# Patient Record
Sex: FEMALE | Employment: UNEMPLOYED | URBAN - METROPOLITAN AREA
[De-identification: names, ages, dates, MRNs, and addresses within clinical notes are randomized per-mention and may not be internally consistent; named-entity substitution may affect disease eponyms.]

---

## 2017-02-13 ENCOUNTER — OFFICE VISIT (OUTPATIENT)
Dept: INTERNAL MEDICINE | Facility: CLINIC | Age: 37
End: 2017-02-13
Payer: MEDICAID

## 2017-02-13 VITALS
TEMPERATURE: 98.7 F | HEIGHT: 60 IN | SYSTOLIC BLOOD PRESSURE: 102 MMHG | HEART RATE: 78 BPM | BODY MASS INDEX: 27.66 KG/M2 | DIASTOLIC BLOOD PRESSURE: 62 MMHG | WEIGHT: 140.9 LBS

## 2017-02-13 DIAGNOSIS — L70.0 ACNE VULGARIS: Primary | ICD-10-CM

## 2017-02-13 DIAGNOSIS — Z13.6 CARDIOVASCULAR SCREENING; LDL GOAL LESS THAN 160: ICD-10-CM

## 2017-02-13 PROCEDURE — 99395 PREV VISIT EST AGE 18-39: CPT | Performed by: NURSE PRACTITIONER

## 2017-02-13 RX ORDER — BENZOYL PEROXIDE 10 G/100G
GEL TOPICAL DAILY
COMMUNITY
End: 2023-05-24

## 2017-02-13 NOTE — PROGRESS NOTES
SUBJECTIVE:     CC: Althea Garnt is an 36 year old woman who presents for preventive health visit.     Healthy Habits:    Do you get at least three servings of calcium containing foods daily (dairy, green leafy vegetables, etc.)? yes    Amount of exercise or daily activities, outside of work: Walk 30-45 minutes per day.    Problems taking medications regularly No    Medication side effects: No    Have you had an eye exam in the past two years? yes    Do you see a dentist twice per year? yes    Do you have sleep apnea, excessive snoring or daytime drowsiness?no            Today's PHQ-2 Score:   PHQ-2 ( 1999 [employer]) 2/13/2017 1/20/2016   Q1: Little interest or pleasure in doing things 0 -   Q2: Feeling down, depressed or hopeless 0 -   PHQ-2 Score 0 -   Little interest or pleasure in doing things - Not at all   Feeling down, depressed or hopeless - Not at all   PHQ-2 Score - 0       Abuse: Current or Past(Physical, Sexual or Emotional)- No  Do you feel safe in your environment - Yes    Social History   Substance Use Topics     Smoking status: Never Smoker     Smokeless tobacco: Not on file     Alcohol use No     The patient does not drink >3 drinks per day nor >7 drinks per week.    Recent Labs   Lab Test  01/20/16   1058   CHOL  151   HDL  55   LDL  89   TRIG  35   NHDL  96       Reviewed orders with patient.  Reviewed health maintenance and updated orders accordingly - Yes    Mammo Decision Support:  Mammogram not appropriate for this patient based on age.    Pertinent mammograms are reviewed under the imaging tab.  History of abnormal Pap smear: NO - age 30- 65 PAP every 3 years recommended  All Histories reviewed and updated in Epic.      ROS:  C: NEGATIVE for fever, chills, change in weight  I: NEGATIVE for worrisome rashes, moles or lesions  E: NEGATIVE for vision changes or irritation  ENT: NEGATIVE for ear, mouth and throat problems  R: NEGATIVE for significant cough or SOB  B: NEGATIVE for masses,  "tenderness or discharge  CV: NEGATIVE for chest pain, palpitations or peripheral edema  GI: NEGATIVE for nausea, abdominal pain, heartburn, or change in bowel habits  : NEGATIVE for unusual urinary or vaginal symptoms. Periods are regular.  M: NEGATIVE for significant arthralgias or myalgia  N: NEGATIVE for weakness, dizziness or paresthesias  P: NEGATIVE for changes in mood or affect    BP Readings from Last 3 Encounters:   02/13/17 102/62   01/20/16 110/72   12/07/15 118/68    Wt Readings from Last 3 Encounters:   02/13/17 140 lb 14.4 oz (63.9 kg)   01/20/16 134 lb 12.8 oz (61.1 kg)   12/07/15 134 lb 6.4 oz (61 kg)                  Patient Active Problem List   Diagnosis     CARDIOVASCULAR SCREENING; LDL GOAL LESS THAN 160     Acne vulgaris     History reviewed. No pertinent past surgical history.    Social History   Substance Use Topics     Smoking status: Never Smoker     Smokeless tobacco: Not on file     Alcohol use No     History reviewed. No pertinent family history.      Current Outpatient Prescriptions   Medication Sig Dispense Refill     benzoyl peroxide 10 % topical gel Apply topically daily       benzoyl peroxide 10 % CREA Externally apply topically 2 times daily 28 g 11     OBJECTIVE:     /62 (BP Location: Right arm, Patient Position: Chair, Cuff Size: Adult Regular)  Pulse 78  Temp 98.7  F (37.1  C) (Oral)  Ht 4' 11.75\" (1.518 m)  Wt 140 lb 14.4 oz (63.9 kg)  LMP 01/01/2017 (Exact Date)  Breastfeeding? No  BMI 27.75 kg/m2  EXAM:  GENERAL: healthy, alert and no distress  NECK: no adenopathy, no asymmetry, masses, or scars and thyroid normal to palpation  RESP: lungs clear to auscultation - no rales, rhonchi or wheezes  ABDOMEN: soft, nontender, no hepatosplenomegaly, no masses and bowel sounds normal  MS: no gross musculoskeletal defects noted, no edema    ASSESSMENT/PLAN:         ICD-10-CM    1. Acne vulgaris L70.0 benzoyl peroxide 10 % CREA   2. CARDIOVASCULAR SCREENING; LDL GOAL LESS THAN " "160 Z13.6        COUNSELING:   Reviewed preventive health counseling, as reflected in patient instructions         reports that she has never smoked. She does not have any smokeless tobacco history on file.    Estimated body mass index is 27.75 kg/(m^2) as calculated from the following:    Height as of this encounter: 4' 11.75\" (1.518 m).    Weight as of this encounter: 140 lb 14.4 oz (63.9 kg).   Weight management plan: Discussed healthy diet and exercise guidelines and patient will follow up in 12 months in clinic to re-evaluate.    Counseling Resources:  ATP IV Guidelines  Pooled Cohorts Equation Calculator  Breast Cancer Risk Calculator  FRAX Risk Assessment  ICSI Preventive Guidelines  Dietary Guidelines for Americans, 2010  USDA's MyPlate  ASA Prophylaxis  Lung CA Screening    Lili Valente NP  [facility]  "

## 2017-02-13 NOTE — MR AVS SNAPSHOT
After Visit Summary   2/13/2017    Althea Grant    MRN: [id number]           Patient Information     Date Of Birth          1980        Visit Information        Provider Department      2/13/2017 9:40 AM Lili Valente NP [facility]        Today's Diagnoses     Acne vulgaris    -  1    CARDIOVASCULAR SCREENING; LDL GOAL LESS THAN 160          Care Instructions      Preventive Health Recommendations  Female Ages 26 - 39  Yearly exam:   See your health care provider every year in order to    Review health changes.     Discuss preventive care.      Review your medicines if you your doctor has prescribed any.    Until age 30: Get a Pap test every three years (more often if you have had an abnormal result).    After age 30: Talk to your doctor about whether you should have a Pap test every 3 years or have a Pap test with HPV screening every 5 years.   You do not need a Pap test if your uterus was removed (hysterectomy) and you have not had cancer.  You should be tested each year for STDs (sexually transmitted diseases), if you're at risk.   Talk to your provider about how often to have your cholesterol checked.  If you are at risk for diabetes, you should have a diabetes test (fasting glucose).  Shots: Get a flu shot each year. Get a tetanus shot every 10 years.   Nutrition:     Eat at least 5 servings of fruits and vegetables each day.    Eat whole-grain bread, whole-wheat pasta and brown rice instead of white grains and rice.    Talk to your provider about Calcium and Vitamin D.     Lifestyle    Exercise at least 150 minutes a week (30 minutes a day, 5 days of the week). This will help you control your weight and prevent disease.    Limit alcohol to one drink per day.    No smoking.     Wear sunscreen to prevent skin cancer.    See your dentist every six months for an exam and cleaning.          Follow-ups after your visit        Who to contact     If you have questions  "or need follow up information about today's clinic visit or your schedule please contact [facility] directly at [phone].  Normal or non-critical lab and imaging results will be communicated to you by Spectrum[employer], letter or phone within 4 business days after the clinic has received the results. If you do not hear from us within 7 days, please contact the clinic through [employer] or phone. If you have a critical or abnormal lab result, we will notify you by phone as soon as possible.  Submit refill requests through [employer] or call your pharmacy and they will forward the refill request to us. Please allow 3 business days for your refill to be completed.          Additional Information About Your Visit        [employer] Information     [employer]re[employer] lets you send messages to your doctor, view your test results, renew your prescriptions, schedule appointments and more. To sign up, go to [email or web address] . Click on \"Log in\" on the left side of the screen, which will take you to the Welcome page. Then click on \"Sign up Now\" on the right side of the page.     You will be asked to enter the access code listed below, as well as some personal information. Please follow the directions to create your username and password.     Your access code is: [email or web address]  Expires: 2017 10:37 AM     Your access code will  in 90 days. If you need help or a new code, please call your [facility] clinic or [phone].        Care EveryWhere ID     This is your Care EveryWhere ID. This could be used by other organizations to access your [facility] medical records  [id number]        Your Vitals Were     Pulse Temperature Height Last Period Breastfeeding? BMI (Body Mass Index)    78 98.7  F (37.1  C) (Oral) 4' 11.75\" (1.518 m) 2017 (Exact Date) No 27.75 kg/m2       Blood Pressure from Last 3 Encounters:   17 102/62   16 110/72   12/07/15 118/68    Weight from Last 3 Encounters:   17 140 lb 14.4 oz " (63.9 kg)   01/20/16 134 lb 12.8 oz (61.1 kg)   12/07/15 134 lb 6.4 oz (61 kg)              Today, you had the following     No orders found for display         Today's Medication Changes          These changes are accurate as of: 2/13/17 10:37 AM.  If you have any questions, ask your nurse or doctor.               These medicines have changed or have updated prescriptions.        Dose/Directions    * benzoyl peroxide 10 % topical gel   This may have changed:  Another medication with the same name was added. Make sure you understand how and when to take each.   Changed by:  Lili Valente NP        Apply topically daily   Refills:  0       * benzoyl peroxide 10 % Crea   This may have changed:  You were already taking a medication with the same name, and this prescription was added. Make sure you understand how and when to take each.   Used for:  Acne vulgaris   Changed by:  Lili Valente NP        Externally apply topically 2 times daily   Quantity:  28 g   Refills:  11       * Notice:  This list has 2 medication(s) that are the same as other medications prescribed for you. Read the directions carefully, and ask your doctor or other care provider to review them with you.         Where to get your medicines      These medications were sent to [facility] [address] [place] [id number] [facility]  [address] [facility] [address]     Phone:  [phone]     benzoyl peroxide 10 % Crea                Primary Care Provider Office Phone # Fax #    Jaz ChristianISAEL Arroyo [facility] [phone]       [address] [address]        Thank you!     Thank you for choosing [facility]  for your care. Our goal is always to provide you with excellent care. Hearing back from our patients is one way we can continue to improve our services. Please take a few minutes to complete the written survey that you may receive in the mail after your  visit with us. Thank you!             Your Updated Medication List - Protect others around you: Learn how to safely use, store and throw away your medicines at www.disposemymeds.org.          This list is accurate as of: 2/13/17 10:37 AM.  Always use your most recent med list.                   Brand Name Dispense Instructions for use    * benzoyl peroxide 10 % topical gel      Apply topically daily       * benzoyl peroxide 10 % Crea     28 g    Externally apply topically 2 times daily       * Notice:  This list has 2 medication(s) that are the same as other medications prescribed for you. Read the directions carefully, and ask your doctor or other care provider to review them with you.

## 2019-05-02 ENCOUNTER — TELEPHONE (OUTPATIENT)
Dept: FAMILY MEDICINE | Facility: CLINIC | Age: 39
End: 2019-05-02

## 2019-05-02 NOTE — TELEPHONE ENCOUNTER
5/2/2019    Call Regarding ReattributionPhysical    Attempt 1    Message with male    Comments: SPOKE TO MOTHER KAUSHIK, WILL HAVE PATIENT CALL AND SCHEDULE ON OWN TIME.       Outreach   JOSEPH

## 2019-12-04 NOTE — TELEPHONE ENCOUNTER
12/4/2019    Call Regarding ReattributionPhysical    Attempt 2    Comments: left bryce Nicholson

## 2019-12-12 ENCOUNTER — TELEPHONE (OUTPATIENT)
Dept: SCHEDULING | Facility: CLINIC | Age: 39
End: 2019-12-12

## 2023-05-24 ENCOUNTER — OFFICE VISIT (OUTPATIENT)
Dept: INTERNAL MEDICINE | Facility: CLINIC | Age: 43
End: 2023-05-24
Payer: MEDICAID

## 2023-05-24 VITALS
HEART RATE: 78 BPM | DIASTOLIC BLOOD PRESSURE: 80 MMHG | SYSTOLIC BLOOD PRESSURE: 144 MMHG | RESPIRATION RATE: 20 BRPM | BODY MASS INDEX: 31.53 KG/M2 | WEIGHT: 160.6 LBS | TEMPERATURE: 98.7 F | OXYGEN SATURATION: 99 % | HEIGHT: 60 IN

## 2023-05-24 DIAGNOSIS — Z13.220 SCREENING FOR HYPERLIPIDEMIA: ICD-10-CM

## 2023-05-24 DIAGNOSIS — Z12.31 ENCOUNTER FOR SCREENING MAMMOGRAM FOR BREAST CANCER: ICD-10-CM

## 2023-05-24 DIAGNOSIS — Z13.0 SCREENING FOR IRON DEFICIENCY ANEMIA: ICD-10-CM

## 2023-05-24 DIAGNOSIS — Z13.1 SCREENING FOR DIABETES MELLITUS: ICD-10-CM

## 2023-05-24 DIAGNOSIS — Z00.00 ENCOUNTER FOR PREVENTIVE HEALTH EXAMINATION: Primary | ICD-10-CM

## 2023-05-24 DIAGNOSIS — Z59.9 NEED FOR FINANCIAL SUPPORT: ICD-10-CM

## 2023-05-24 LAB
ANION GAP SERPL CALCULATED.3IONS-SCNC: 12 MMOL/L (ref 7–15)
BUN SERPL-MCNC: 9.6 MG/DL (ref 6–20)
CALCIUM SERPL-MCNC: 9.8 MG/DL (ref 8.6–10)
CHLORIDE SERPL-SCNC: 102 MMOL/L (ref 98–107)
CHOLEST SERPL-MCNC: 162 MG/DL
CREAT SERPL-MCNC: 0.65 MG/DL (ref 0.51–0.95)
DEPRECATED HCO3 PLAS-SCNC: 23 MMOL/L (ref 22–29)
ERYTHROCYTE [DISTWIDTH] IN BLOOD BY AUTOMATED COUNT: 12.7 % (ref 10–15)
GFR SERPL CREATININE-BSD FRML MDRD: >90 ML/MIN/1.73M2
GLUCOSE SERPL-MCNC: 92 MG/DL (ref 70–99)
HCT VFR BLD AUTO: 38.7 % (ref 35–47)
HDLC SERPL-MCNC: 53 MG/DL
HGB BLD-MCNC: 13.1 G/DL (ref 11.7–15.7)
LDLC SERPL CALC-MCNC: 86 MG/DL
MCH RBC QN AUTO: 30.7 PG (ref 26.5–33)
MCHC RBC AUTO-ENTMCNC: 33.9 G/DL (ref 31.5–36.5)
MCV RBC AUTO: 91 FL (ref 78–100)
NONHDLC SERPL-MCNC: 109 MG/DL
PLATELET # BLD AUTO: 329 10E3/UL (ref 150–450)
POTASSIUM SERPL-SCNC: 4 MMOL/L (ref 3.4–5.3)
RBC # BLD AUTO: 4.27 10E6/UL (ref 3.8–5.2)
SODIUM SERPL-SCNC: 137 MMOL/L (ref 136–145)
TRIGL SERPL-MCNC: 117 MG/DL
WBC # BLD AUTO: 10.4 10E3/UL (ref 4–11)

## 2023-05-24 PROCEDURE — 80048 BASIC METABOLIC PNL TOTAL CA: CPT

## 2023-05-24 PROCEDURE — 36415 COLL VENOUS BLD VENIPUNCTURE: CPT

## 2023-05-24 PROCEDURE — 99386 PREV VISIT NEW AGE 40-64: CPT

## 2023-05-24 PROCEDURE — 80061 LIPID PANEL: CPT

## 2023-05-24 PROCEDURE — 85027 COMPLETE CBC AUTOMATED: CPT

## 2023-05-24 SDOH — ECONOMIC STABILITY - INCOME SECURITY: PROBLEM RELATED TO HOUSING AND ECONOMIC CIRCUMSTANCES, UNSPECIFIED: Z59.9

## 2023-05-24 ASSESSMENT — ENCOUNTER SYMPTOMS
DYSURIA: 0
ARTHRALGIAS: 0
COUGH: 0
ABDOMINAL PAIN: 0
PALPITATIONS: 0
CHILLS: 0
FREQUENCY: 0
SORE THROAT: 0
EYE PAIN: 0
CONSTIPATION: 0
MYALGIAS: 0
HEMATOCHEZIA: 0
JOINT SWELLING: 0
WEAKNESS: 0
DIZZINESS: 0
PARESTHESIAS: 0
NERVOUS/ANXIOUS: 0
HEMATURIA: 0
DIARRHEA: 0
HEADACHES: 0
BREAST MASS: 0
NAUSEA: 0
SHORTNESS OF BREATH: 0
HEARTBURN: 0
FEVER: 0

## 2023-05-24 ASSESSMENT — PAIN SCALES - GENERAL: PAINLEVEL: NO PAIN (0)

## 2023-05-24 NOTE — PROGRESS NOTES
SUBJECTIVE:   CC: Althea is an 42 year old who presents for preventive health visit.       2023     1:09 PM   Additional Questions   Roomed by Edelmira   Accompanied by mother in-law Jody         2023     1:09 PM   Patient Reported Additional Medications   Patient reports taking the following new medications no     Patient has been advised of split billing requirements and indicates understanding: Yes  Healthy Habits:     Getting at least 3 servings of Calcium per day:  Yes    Bi-annual eye exam:  NO    Dental care twice a year:  Yes    Sleep apnea or symptoms of sleep apnea:  None    Diet:  Other    Frequency of exercise:  6-7 days/week    Duration of exercise:  15-30 minutes    Taking medications regularly:  Yes    Medication side effects:  None and Other    PHQ-2 Total Score: 0    Additional concerns today:  No        Today's PHQ-2 Score:       2023     1:01 PM   PHQ-2 (  [employer])   Q1: Little interest or pleasure in doing things 0   Q2: Feeling down, depressed or hopeless 0   PHQ-2 Score 0   Q1: Little interest or pleasure in doing things Not at all   Q2: Feeling down, depressed or hopeless Not at all   PHQ-2 Score 0       Have you ever done Advance Care Planning? (For example, a Health Directive, POLST, or a discussion with a medical provider or your loved ones about your wishes): No, advance care planning information given to patient to review.  Patient plans to discuss their wishes with loved ones or provider.      Social History     Tobacco Use     Smoking status: Never     Smokeless tobacco: Not on file   Vaping Use     Vaping status: Never Used   Substance Use Topics     Alcohol use: No             2023     1:00 PM   Alcohol Use   Prescreen: >3 drinks/day or >7 drinks/week? Not Applicable     Reviewed orders with patient.  Reviewed health maintenance and updated orders accordingly - Yes  Lab work is in process    Breast Cancer Screenin/24/2023     1:01 PM   Breast  "CA Risk Assessment (FHS-7)   Do you have a family history of breast, colon, or ovarian cancer? No / Unknown       Pertinent mammograms are reviewed under the imaging tab.    History of abnormal Pap smear: results below       1/20/2016    12:00 AM   PAP / HPV   PAP (Historical) NIL       Reviewed and updated as needed this visit by clinical staff   Tobacco  Allergies  Meds              Reviewed and updated as needed this visit by Provider                   Review of Systems   Constitutional: Negative for chills and fever.   HENT: Negative for congestion, ear pain, hearing loss and sore throat.    Eyes: Negative for pain and visual disturbance.   Respiratory: Negative for cough and shortness of breath.    Cardiovascular: Negative for chest pain, palpitations and peripheral edema.   Gastrointestinal: Negative for abdominal pain, constipation, diarrhea, heartburn, hematochezia and nausea.   Breasts:  Negative for tenderness, breast mass and discharge.   Genitourinary: Negative for dysuria, frequency, genital sores, hematuria, pelvic pain, urgency, vaginal bleeding and vaginal discharge.   Musculoskeletal: Negative for arthralgias, joint swelling and myalgias.   Skin: Negative for rash.   Neurological: Negative for dizziness, weakness, headaches and paresthesias.   Psychiatric/Behavioral: Negative for mood changes. The patient is not nervous/anxious.         OBJECTIVE:   BP (!) 144/80 (BP Location: Right arm, Patient Position: Sitting, Cuff Size: Adult Regular)   Pulse 78   Temp 98.7  F (37.1  C) (Oral)   Resp 20   Ht 1.511 m (4' 11.5\")   Wt 72.8 kg (160 lb 9.6 oz)   LMP 05/10/2023 (Approximate)   SpO2 99%   Breastfeeding No   BMI 31.89 kg/m    Physical Exam  Constitutional:       Appearance: Normal appearance.   Cardiovascular:      Rate and Rhythm: Normal rate and regular rhythm.      Heart sounds: Normal heart sounds.   Pulmonary:      Effort: Pulmonary effort is normal.      Breath sounds: Normal breath " "sounds.   Skin:     General: Skin is warm and dry.   Neurological:      Mental Status: She is alert and oriented to person, place, and time.   Psychiatric:         Attention and Perception: She is inattentive.         Mood and Affect: Mood and affect normal.         Speech: Speech normal.         Behavior: Behavior is hyperactive.         Thought Content: Thought content normal.         Diagnostic Test Results:  Labs reviewed in Epic    ASSESSMENT/PLAN:   (Z00.00) Encounter for preventive health examination  (primary encounter diagnosis)  Comment: Pt presents for physical. She is due for pap smear- will schedule this. Pt states her  made her come in for physical but she is healthy as can be and doesn't require any monitoring. After further discussion, she is willing to have labs done.    (Z59.9) Need for financial support  Plan: Primary Care - Care Coordination Referral            (Z13.1) Screening for diabetes mellitus  Plan: Basic metabolic panel  (Ca, Cl, CO2, Creat,         Gluc, K, Na, BUN)          (Z13.0) Screening for iron deficiency anemia  Plan: CBC with platelets            (Z13.220) Screening for hyperlipidemia  Plan: Lipid panel reflex to direct LDL Non-fasting            (Z12.31) Encounter for screening mammogram for breast cancer  Plan: *MA Screening Digital Bilateral             Patient has been advised of split billing requirements and indicates understanding: Yes      COUNSELING:  Reviewed preventive health counseling, as reflected in patient instructions       Regular exercise       Healthy diet/nutrition      BMI:   Estimated body mass index is 31.89 kg/m  as calculated from the following:    Height as of this encounter: 1.511 m (4' 11.5\").    Weight as of this encounter: 72.8 kg (160 lb 9.6 oz).   Weight management plan: Discussed healthy diet and exercise guidelines      She reports that she has never smoked. She does not have any smokeless tobacco history on file.          Mar Coles, " APRN CNP  [facility]

## 2023-05-24 NOTE — LETTER
May 30, 2023      Althea DAMON Juanita  [address] [place] [address]        Dear ,    We are writing to inform you of your test results.    The blood counts are normal. The LDL, bad cholesterol, is at your goal of < 130.  Your blood sugar and kidney tests, creatinine and GFR, are normal.     Resulted Orders   Basic metabolic panel  (Ca, Cl, CO2, Creat, Gluc, K, Na, BUN)   Result Value Ref Range    Sodium 137 136 - 145 mmol/L    Potassium 4.0 3.4 - 5.3 mmol/L    Chloride 102 98 - 107 mmol/L    Carbon Dioxide (CO2) 23 22 - 29 mmol/L    Anion Gap 12 7 - 15 mmol/L    Urea Nitrogen 9.6 6.0 - 20.0 mg/dL    Creatinine 0.65 0.51 - 0.95 mg/dL    Calcium 9.8 8.6 - 10.0 mg/dL    Glucose 92 70 - 99 mg/dL    GFR Estimate >90 >60 mL/min/1.73m2      Comment:      eGFR calculated using 2021 CKD-EPI equation.   Lipid panel reflex to direct LDL Non-fasting   Result Value Ref Range    Cholesterol 162 <200 mg/dL    Triglycerides 117 <150 mg/dL    Direct Measure HDL 53 >=50 mg/dL    LDL Cholesterol Calculated 86 <=100 mg/dL    Non HDL Cholesterol 109 <130 mg/dL    Narrative    Cholesterol  Desirable:  <200 mg/dL    Triglycerides  Normal:  Less than 150 mg/dL  Borderline High:  150-199 mg/dL  High:  200-499 mg/dL  Very High:  Greater than or equal to 500 mg/dL    Direct Measure HDL  Female:  Greater than or equal to 50 mg/dL   Male:  Greater than or equal to 40 mg/dL    LDL Cholesterol  Desirable:  <100mg/dL  Above Desirable:  100-129 mg/dL   Borderline High:  130-159 mg/dL   High:  160-189 mg/dL   Very High:  >= 190 mg/dL    Non HDL Cholesterol  Desirable:  130 mg/dL  Above Desirable:  130-159 mg/dL  Borderline High:  160-189 mg/dL  High:  190-219 mg/dL  Very High:  Greater than or equal to 220 mg/dL   CBC with platelets   Result Value Ref Range    WBC Count 10.4 4.0 - 11.0 10e3/uL    RBC Count 4.27 3.80 - 5.20 10e6/uL    Hemoglobin 13.1 11.7 - 15.7 g/dL    Hematocrit 38.7 35.0 - 47.0 %    MCV 91 78 - 100 fL    MCH  30.7 26.5 - 33.0 pg    MCHC 33.9 31.5 - 36.5 g/dL    RDW 12.7 10.0 - 15.0 %    Platelet Count 329 150 - 450 10e3/uL       If you have any questions or concerns, please call the clinic at the number listed above.       Sincerely,      ISAEL Mills CNP

## 2023-05-25 ENCOUNTER — PATIENT OUTREACH (OUTPATIENT)
Dept: CARE COORDINATION | Facility: CLINIC | Age: 43
End: 2023-05-25
Payer: MEDICAID

## 2023-05-25 NOTE — PROGRESS NOTES
Clinic Care Coordination Contact  Community Health Worker Initial Outreach    CHW Initial Information Gathering:  Referral Source: PCP  Current living arrangement:: I live in a private home  Community Resources: [facility]  Informal Support system:: Children, Family (Mother in-law Jody)  No PCP office visit in Past Year: No  Transportation means:: Family (Mother in-lawJody)       Patient accepts CC: Yes. Patient scheduled for assessment with CC SW on 5/31/2023 at 10:00 AM. Patient noted desire to discuss Housing.     5-25, CHW:    CHW was able to connect with the Patient's mother in-lawJody (CTC), and introduce self/care coordination and intent of call.     Jody shared that both the Patient and her son are obtaining Social Security Disability. Currently, they are living with their daughter. Jody is hoping the Patient and her son can transition into their own housing. Jody believes they were working with [facility] on obtaining a housing voucher, however she is unsure the status of this.     Both the Patient and her  do not have a drivers license. So, they rely on Jody to bring them to and from medical appointments etc. However, Jody states she is increasing in age and will not be able to assist long-term.    Jody had no other questions or concerns at this time. Her main concern is housing and transportation for the Patient and her son.       Claire TRONCOSO. Public Health  Community Health Worker  [facility]  Clinic Care Coordination  [phone]

## 2023-05-31 ENCOUNTER — PATIENT OUTREACH (OUTPATIENT)
Dept: NURSING | Facility: CLINIC | Age: 43
End: 2023-05-31
Payer: MEDICAID

## 2023-05-31 ASSESSMENT — ACTIVITIES OF DAILY LIVING (ADL): DEPENDENT_IADLS:: TRANSPORTATION

## 2023-05-31 NOTE — LETTER
[facility] CARE COORDINATION  [address] [place] [address]    May 31, 2023      Jody Grant  [address] [place] [address]          Dear Jody/Althea,    I am a clinic care coordinator who works with the [facility] [place] [facility]. I wanted to thank you for spending the time to talk with me. Below is a description of clinic care coordination and how I can further assist you shall any further needs arise.       The clinic care coordination team is made up of a registered nurse, , financial resource worker and community health worker who understand the health care system. The goal of clinic care coordination is to help you manage your health and improve access to the health care system. Our team works alongside your provider to assist you in determining your health and social needs. We can help you obtain health care and community resources, providing you with necessary information and education. We can work with you through any barriers and develop a care plan that helps coordinate and strengthen the communication between you and your care team.  Our services are voluntary and are offered without charge to you personally.    Please feel free to contact me with any questions or concerns regarding care coordination and what we can offer.      We are focused on providing you with the highest-quality healthcare experience possible.    Sincerely,     JOAO Martinez/[facility]  Social Work Care Coordinator  [facility] - [place], [place], [facility], and [place]  Phone: [phone]    Enclosed: I have enclosed the resources we discussed over the phone. Please review and call me with any questions.       Resources    [place] Transportation Programs:   https://www.mtInnobits-inc.net/[place]/  Who can get rides?   In [place], [facility] provides NEMT for recipients who are one of the following:  Enrolled in Medical Assistance  Enrolled in  state-only funded Medical Assistance benefits due to residing in an Institution of Mental Diseases (IMD)  A [facility] enrollee under the age of 21  A pregnant woman enrolled in [facility]  Enrolled in Refugee Medical Assistance (RMA)  Recipients must be attending a covered medical service and have no other way to get there. If you can drive yourself to your covered appointment, or if a neighbor, friend, relative, or voluntary organization is able to give you a ride for free, you cannot get a ride through [facility]. However, you may be eligible for mileage reimbursement. [email or web address]    How do I schedule a ride?   Call us at [phone] at least three business days (Monday through Friday) before your healthcare appointment. If you call with less notice and the trip is not urgent, you may need to set up your visit for a different date. We schedule routine trips Monday through Friday from 7 a.m. to 6 p.m. Urgent rides can be scheduled after these hours, 24 hours a day, seven days a week.  When you call, please be ready to provide:  Your name, home address, and phone number  Your Medical Assistance ID Number (PMI#), an eight-digit number that begins with 0  The name, phone number, address, and ZIP code of the healthcare provider you are seeing  The date and start time of your appointment  The end time of your appointment, if you know it  Any special ride needs, including if you need someone to ride with you  General reason for the appointment (check-up, eye doctor's appointment, etc.)  If you are hearing or speech impaired, please call [phone] for TTY services. If you speak another language, [facility] will connect you with a  when you call [phone].    Remember:  All rides must be for an eligible, covered medical service  You can schedule a ride Monday through Friday from 7 a.m. to 6 p.m.  You must call at least three business days before your appointment  Have your trip  information ready when you call  Be ready at least 15 minutes before your ride is scheduled to arrive    Important Toll-Free Phone Numbers:  To schedule a ride call [phone]  To file a complaint call [phone]  If your ride is late call [phone]

## 2023-05-31 NOTE — PROGRESS NOTES
Clinic Care Coordination Contact    Clinic Care Coordination Contact  OUTREACH    Referral Information:  Referral Source: PCP    Chief Complaint   Patient presents with     Clinic Care Coordination - Initial      Universal Utilization: Reviewed 5/31/2023  Clinic Utilization  Difficulty keeping appointments:: No  Compliance Concerns: No  No-Show Concerns: No  No PCP office visit in Past Year: No  Utilization    Hospital Admissions  0             ED Visits  0             No Show Count (past year)  0                Current as of: 5/30/2023  9:15 AM            Clinical Concerns:  Current Medical Concerns:    Patient Active Problem List   Diagnosis     CARDIOVASCULAR SCREENING; LDL GOAL LESS THAN 160     Acne vulgaris   Current Behavioral Concerns: none noted     Education Provided to patient/family: [facility] role and reason for call.    Pain  Pain (GOAL):: No  Health Maintenance Reviewed: Due/Overdue   Health Maintenance Due   Topic Date Due     HEPATITIS B IMMUNIZATION (1 of 3 - 3-dose series) Never done     HIV SCREENING  Never done     HEPATITIS C SCREENING  Never done     PAP  01/20/2019     COVID-19 Vaccine (3 - Pfizer series) 11/10/2021     INFLUENZA VACCINE (1) 09/01/2022   Clinical Pathway: None    Medication Management:  Medication review status: Medications reviewed and no changes reported per patient.        No current outpatient medications on file.     No current facility-administered medications for this visit.      Functional Status:  Dependent IADLs:: Transportation    Living Situation:  Current living arrangement:: I live in a private home with family    Lifestyle & Psychosocial Needs:  Social Determinants of Health     Tobacco Use: Unknown (5/24/2023)    Patient History      Smoking Tobacco Use: Never      Smokeless Tobacco Use: Unknown      Passive Exposure: Not on file   Alcohol Use: Not on file   Financial Resource Strain: Not on file   Food Insecurity: Not on file   Transportation Needs: Not on file    Physical Activity: Not on file   Stress: Not on file   Social Connections: Not on file   Intimate Partner Violence: Not on file   Depression: Not at risk (5/24/2023)    PHQ-2      PHQ-2 Score: 0   Housing Stability: Not on file     Transportation means:: Family (Mother in-law, Jody)  Orthodox or spiritual beliefs that impact treatment:: No  Informal Support system:: Children, Family (Mother in-law Jody)     [facility] spoke with patients Jody SERNA, (consent to communicate on file) to introduce self and offer Care Coordination services. Jody shared patient and her spouse, Reggie, are in need of housing. Jody shared they use to live with her, however, recently moved out due to limited space. Patient/spouse currently living with their 22 year old daughter while awaiting housing for self. Jody requesting assistance on patients behalf for assistance with obtaining housing support/resources. Per report patient/spouse already established with housing supports/resources. Reviewed with Jody whom was unable to confirm. Jody requesting CC contact patient/spouse to explore further. [facility] spoke with patient/spouse whom shared they have received resources for housing and plan to establish contact this week. Patient reports she was provided with contact information for [facility], [employer], [employer], [employer], and [employer]. [facility] reviewed above agencies and programs they offer. Patient expressed understanding and declined any further CC needs at this time. [facility] updated patients Jody SERNA, on above. Jody thankful patient/spouse are connected with support and services regarding housing. Allowed time and space for Jody to voice any additional CC needs. Jody shared transportation is also a barrier for patient/spouse and requested resources. Reviewed patient has transportation benefits to medical appointments via [place]. Jody expressed understanding. Offered to send additional information on  above benefit, as well as, contact information to schedule transportation. Jody in agreement with plan and requested [place] [address]. Please refer to letter sent 6/1/2023 for further information regarding resources provided. No further CC needs identified at this time. Patient/family declined CC enrollment and shared preference is to contact [facility] if/when further needs arise.      Resources and Interventions:  Current Resources:   Community Resources: [facility]  Supplies Currently Used at Home: None  Equipment Currently Used at Home: none  Employment Status: disabled  Advance Care Plan/Directive  Advanced Care Plans/Directives on file:: No  Referrals Placed: Community Resources    Care Plan: None    Plan: Patient/family were provided with SW CC's contact information and encouraged to call with questions, concerns, and/or support needs. SW CC will remain available as needed. No further care coordination outreaches will be made at this time.     JOAO Martinez/LICSW  Social Work Care Coordinator  [facility] - [place], [place], [facility], and [place]  Phone: [phone]

## 2023-06-12 ENCOUNTER — DOCUMENTATION ONLY (OUTPATIENT)
Dept: OTHER | Facility: CLINIC | Age: 43
End: 2023-06-12
Payer: MEDICAID

## 2023-06-14 ENCOUNTER — ALLIED HEALTH/NURSE VISIT (OUTPATIENT)
Dept: INTERNAL MEDICINE | Facility: CLINIC | Age: 43
End: 2023-06-14
Payer: MEDICAID

## 2023-06-14 DIAGNOSIS — Z23 HIGH PRIORITY FOR 2019-NCOV VACCINE: Primary | ICD-10-CM

## 2023-06-14 PROCEDURE — 99207 PR NO CHARGE NURSE ONLY: CPT

## 2023-06-14 PROCEDURE — 0121A COVID-19 BIVALENT 12+ (PFIZER): CPT

## 2023-06-14 PROCEDURE — 91312 COVID-19 BIVALENT 12+ (PFIZER): CPT

## 2023-06-14 NOTE — NURSING NOTE
Prior to immunization administration, verified patients identity using patient s name and date of birth. Please see Immunization Activity for additional information.     Screening Questionnaire for Adult Immunization    Are you sick today?   No   Do you have allergies to medications, food, a vaccine component or latex?   No   Have you ever had a serious reaction after receiving a vaccination?   No   Do you have a long-term health problem with heart, lung, kidney, or metabolic disease (e.g., diabetes), asthma, a blood disorder, no spleen, complement component deficiency, a cochlear implant, or a spinal fluid leak?  Are you on long-term aspirin therapy?   No   Do you have cancer, leukemia, HIV/AIDS, or any other immune system problem?   No   Do you have a parent, brother, or sister with an immune system problem?   No   In the past 3 months, have you taken medications that affect  your immune system, such as prednisone, other steroids, or anticancer drugs; drugs for the treatment of rheumatoid arthritis, Crohn s disease, or psoriasis; or have you had radiation treatments?   No   Have you had a seizure, or a brain or other nervous system problem?   No   During the past year, have you received a transfusion of blood or blood    products, or been given immune (gamma) globulin or antiviral drug?   No   For women: Are you pregnant or is there a chance you could become       pregnant during the next month?   No   Have you received any vaccinations in the past 4 weeks?   No     Immunization questionnaire answers were all negative.    I have reviewed the following standing orders:   This patient is due and qualifies for the Covid-19 vaccine.     Click here for COVID-19 Standing Order    I have reviewed the vaccines inclusion and exclusion criteria; No concerns regarding eligibility.     Patient instructed to remain in clinic for 15 minutes afterwards, and to report any adverse reactions.     Screening performed by Maya VINSON  MITESH Pritchard on 6/14/2023 at 3:32 PM.

## 2023-06-28 ENCOUNTER — ANCILLARY PROCEDURE (OUTPATIENT)
Dept: MAMMOGRAPHY | Facility: CLINIC | Age: 43
End: 2023-06-28
Payer: MEDICAID

## 2023-06-28 DIAGNOSIS — Z12.31 ENCOUNTER FOR SCREENING MAMMOGRAM FOR BREAST CANCER: ICD-10-CM

## 2023-06-28 PROCEDURE — 77067 SCR MAMMO BI INCL CAD: CPT | Mod: TC | Performed by: RADIOLOGY

## 2023-10-06 ENCOUNTER — TELEPHONE (OUTPATIENT)
Dept: INTERNAL MEDICINE | Facility: CLINIC | Age: 43
End: 2023-10-06
Payer: MEDICAID

## 2023-10-06 DIAGNOSIS — Z59.9 NEED FOR FINANCIAL SUPPORT: Primary | ICD-10-CM

## 2023-10-06 SDOH — ECONOMIC STABILITY - INCOME SECURITY: PROBLEM RELATED TO HOUSING AND ECONOMIC CIRCUMSTANCES, UNSPECIFIED: Z59.9

## 2023-10-06 NOTE — TELEPHONE ENCOUNTER
Attempted to contact patient twice, but no voicemail set up, no alternative phone number and patient not MyChart active. If patient is okay with care coordination referral for financial help regarding housing, RN can place/sign referral.    Thank you,  Heber Romero, Triage RN Liu Pete  12:53 PM 10/6/2023     
Care coordinator referral signed.   
Patient calls today. She said when she was seen in May 2023, Mar said if you need anything else to pls call.  Patient says she needs help finding a  to help her find housing. She and her  currently live with her daughter. She says its fine but would like to move out to just be with her . She is on ssi so needs help finding housing w/in her budget.     Will this be a care coordinator referral to ?     Patient number [phone]   
How Severe Is This Condition?: moderate

## 2023-10-09 ENCOUNTER — PATIENT OUTREACH (OUTPATIENT)
Dept: CARE COORDINATION | Facility: CLINIC | Age: 43
End: 2023-10-09
Payer: MEDICAID

## 2023-10-09 NOTE — PROGRESS NOTES
Clinic Care Coordination Contact  [facility]/Voicemail       Clinical Data: Care Coordinator Outreach  Outreach attempted x 1.  Left message on patient's voicemail with call back information and requested return call.    Plan: Care Coordinator will try to reach patient again in 1-2 business days.      Claire ALBERTO [facility]  Community Health Worker  [facility]:  [facility] Care Coordination  [phone]

## 2023-10-10 NOTE — PROGRESS NOTES
"Clinic Care Coordination Contact  Community Health Worker Initial Outreach    CHW Initial Information Gathering:  Current living arrangement:: Not Assessed  Informal Support system:: Children, Family  No PCP office visit in Past Year: No       Patient accepts CC: Yes. Patient scheduled for assessment with CC SW on 10/16/2023 at 10:00 AM. Patient noted desire to discuss Section 8 housing choice voucher/public housing through the CDA.     10-10, CHW:    CHW was able to connect with the Patient and introduce self/care coordination and intent of call. Writer unable to complete CHW initial assessment due to the nature of call and subjects frequently being changed.   Patient wondering \"what do you have in mind for me [in terms of housing].\" Writer explained Primary Care Care Coordination role. Per Patient she \"needs a  to get into one of those places.\"  Per discussion, patient has been going to different rental properties and they keep telling her she needs a \"piece of paper\" to qualify. Patient states paperwork from [employer]. Writer informed this is probably a section 8 housing choice voucher through the CDA. Patient does not know where to go to for help states she is feeling lost.  Patient shares she is interested in a townhome or duplex set up rather than apartment style housing for personal reasons.  Patient wondering about credit score and how it relates to renting.   Patient now lives with daughter who helps with computer tasks.         Claire ALBERTO [facility]  Community Health Worker  [facility] Clinics:  [place], [place] & [place]   Clinic Care Coordination  [phone]       "

## 2023-10-16 ENCOUNTER — PATIENT OUTREACH (OUTPATIENT)
Dept: NURSING | Facility: CLINIC | Age: 43
End: 2023-10-16
Payer: MEDICAID

## 2023-10-16 ASSESSMENT — ACTIVITIES OF DAILY LIVING (ADL): DEPENDENT_IADLS:: TRANSPORTATION

## 2023-10-16 NOTE — LETTER
[facility]  Patient Centered Plan of Care  About Me:        Patient Name:  Althea Maldonado    YOB: 1980  Age:         42 year old   Liu MRN:    [id number] Telephone Information:  Home Phone [phone]   Mobile [phone]   Mobile Not on file.       Address:  [address]  [place] [address] Email address:  No e-mail address on record      Emergency Contact(s)    Name Relationship Lgl Grd Work Phone Home Phone Mobile Phone   1. YRN MALDONADO* Daughter No   [phone]   2. JEFF PEREIRA Son-in-Law No   [phone]           Primary language:  English     needed? No   [place] [employer]:  [phone] op. 1  Other communication barriers:None  Preferred Method of Communication:  Mail  Current living arrangement: I live in a private home with family  Mobility Status/ Medical Equipment: Independent    Health Maintenance  Health Maintenance Reviewed: Due/Overdue   Health Maintenance Due   Topic Date Due    HEPATITIS B IMMUNIZATION (1 of 3 - 3-dose series) Never done    HIV SCREENING  Never done    HEPATITIS C SCREENING  Never done    PAP  01/20/2019    INFLUENZA VACCINE (1) 09/01/2023    COVID-19 Vaccine (4 - 2023-24 season) 09/01/2023     My Access Plan  Medical Emergency 911   Primary Clinic Line    24 Hour Appointment Line [phone] or  [phone]) (toll-free)   24 Hour Nurse Line [phone] (toll-free)   Preferred Urgent Care No data recorded   Preferred Hospital [facility]  [phone]     Preferred Pharmacy [facility] [address] [place] [address]     Behavioral Health Crisis Line The National Suicide Prevention Lifeline at 1-485.767.1544 or Text/Call 548     My Care Team Members  Patient Care Team         Relationship Specialty Notifications Start End    No Ref-Primary, Physician PCP - General   5/24/23     Fax: [phone]         Mar Coles, APRN CNP Assigned PCP   5/27/23      Phone: [phone] Fax: [phone]         [address] [facility] [address]    Selina Templeton, [facility] Lead Care Coordinator  - Clinical Admissions 10/10/23     Phone: [phone] Fax: [phone]                  My Care Plans  Self Management and Treatment Plan  Care Plan  Care Plan: Housing Instability       Problem: SDOH LACK OF STABLE HOUSING       Goal: Establish Stable Housing       Start Date: 10/16/2023 Expected End Date: 2/12/2024    This Visit's Progress: 10%    Priority: High    Note:     Barriers: difficulty navigating services, limited support.   Strengths: goal oriented, motivated  Patient expressed understanding of goal: Yes  Action steps to achieve this goal:  I will continue to lean on informal supports of my: family  I will review resources and supports sent to me via [employer]  I will outreach to supports and consider establishing with ones I might find beneficial.   I will contact my care team with questions, concerns, support needs.   I will use the clinic as a resource and I understand I can contact my clinic with 24/7 after hours services available.   I will continue to outreach to care coordination as needed for additional resources or supports.                              Care Plan: Community Resources       Problem: Lack of supportive services       Goal: Obtain a County        Start Date: 10/16/2023 Expected End Date: 2/12/2024    This Visit's Progress: 10%    Priority: High    Note:     Barriers: difficulty navigating services, limited support.   Strengths: goal oriented, motivated  Patient expressed understanding of goal: Yes  Action steps to achieve this goal:  I will continue to lean on informal supports of my: family  I will review resources and supports sent to me via [employer]  I will outreach to supports and consider establishing with ones I might find beneficial.  I will continue to outreach to care coordination as needed for additional  resources or supports.                               Advance Care Plans/Directives Type:   No data recorded      My Medical and Care Information  Problem List   Patient Active Problem List   Diagnosis    CARDIOVASCULAR SCREENING; LDL GOAL LESS THAN 160    Acne vulgaris        Current Medications and Allergies:   No current outpatient medications on file.     No current facility-administered medications for this visit.     No Known Allergies    Care Coordination Start Date: 10/6/2023   Frequency of Care Coordination: monthly     Form Last Updated: 10/17/2023

## 2023-10-16 NOTE — PROGRESS NOTES
Clinic Care Coordination Contact  Clinic Care Coordination Contact  OUTREACH    Referral Information:  Referral Source: PCP  Primary Diagnosis: Psychosocial    Chief Complaint   Patient presents with    Clinic Care Coordination - Initial     Universal Utilization: Reviewed 10/16/2023  Clinic Utilization  Difficulty keeping appointments:: No  Compliance Concerns: No  No-Show Concerns: No  No PCP office visit in Past Year: No  Utilization      No Show Count (past year)  1             ED Visits  0             Hospital Admissions  0                    Current as of: 10/16/2023  3:32 PM              Clinical Concerns:  Current Medical Concerns:    Patient Active Problem List   Diagnosis    CARDIOVASCULAR SCREENING; LDL GOAL LESS THAN 160    Acne vulgaris   Current Behavioral Concerns: none noted     Education Provided to patient: CC role and reason for call.    Pain  Pain (GOAL):: No  Health Maintenance Reviewed: Due/Overdue   Health Maintenance Due   Topic Date Due    HEPATITIS B IMMUNIZATION (1 of 3 - 3-dose series) Never done    HIV SCREENING  Never done    HEPATITIS C SCREENING  Never done    PAP  01/20/2019    INFLUENZA VACCINE (1) 09/01/2023    COVID-19 Vaccine (4 - 2023-24 season) 09/01/2023   Clinical Pathway: None    Medication Management:  Medication review status: Medications not reviewed during this encounter due to nature of call.   No current outpatient medications on file.     No current facility-administered medications for this visit.     Functional Status:  Dependent ADLs:: Independent  Dependent IADLs:: Transportation  Bed or wheelchair confined:: No  Mobility Status: Independent    Living Situation:  Current living arrangement:: I live in a private home with family    Lifestyle & Psychosocial Needs:  Social Determinants of Health     Food Insecurity: Not on file   Depression: Not at risk (5/24/2023)    PHQ-2     PHQ-2 Score: 0   Housing Stability: Low Risk  (10/16/2023)    Housing Stability     Do  you have housing? : Yes     Are you worried about losing your housing?: No   Tobacco Use: Unknown (5/24/2023)    Patient History     Smoking Tobacco Use: Never     Smokeless Tobacco Use: Unknown     Passive Exposure: Not on file   Financial Resource Strain: Low Risk  (10/16/2023)    Financial Resource Strain     Within the past 12 months, have you or your family members you live with been unable to get utilities (heat, electricity) when it was really needed?: No   Alcohol Use: Not on file   Transportation Needs: Low Risk  (10/16/2023)    Transportation Needs     Within the past 12 months, has lack of transportation kept you from medical appointments, getting your medicines, non-medical meetings or appointments, work, or from getting things that you need?: No   Physical Activity: Not on file   Interpersonal Safety: Not on file   Stress: Not on file   Social Connections: Not on file     Diet:: Regular  Inadequate nutrition (GOAL):: No  Tube Feeding: No  Inadequate activity/exercise (GOAL):: No  Significant changes in sleep pattern (GOAL): No  Transportation means:: Family, Medical transport, Regular car     Mormon or spiritual beliefs that impact treatment:: No  Chemical Dependency Status: No Current Concerns  Informal Support system:: Children, Family      [facility] spoke with patient to introduce self and offer Care Coordination services. Per report, patient interested in resources for housing and [facility] case management. Patient shared she and her  are currently residing with their adult daughter, however, wish to obtain their own place. Patient inquiring if writer has vouchers to assist with housing. [facility] provided education on public/income based housing and voucher programs. Reviewed patient will have to apply to a waiting list when open to obtain housing assistance and/or a voucher. Patient expressed understanding. Offered to send patient additional information on housing assistance via mail. Patient in  agreement with plan. [facility] also provided patient with contact information over the phone for [facility] ([phone]) for assistance with applying for open wait list. Patient expressed understanding and thanked writer for information. Patient shared she received some short term rental assistance while living with daughter, however this was to support rent at current residence and not to establish a new residence. Patient shared she attempted to obtain housing on her own, however, was denied due to poor credit history. Reviewed [employer] may be able to assist patient with poor credit history if this is the barrier to obtaining housing. Patient expressed understanding. Offered to include additional information on program in information mailed to patient. Patient in agreement with plan. Patient inquiring is [facility] can locate available homes for rent. Reviewed [facility] role, patient expressed understanding. Patient shared she would like a Critical access hospital . Reviewed patient can contact [facility] and request a MnCHOICES assessment to determine eligibility for Critical access hospital services/case management. Patient expressed understanding. Offered to include additional information on [place] and process of requesting an assessment in resource packet sent via mail. Patient in agreement with plan. Allowed time and space for patient to voice any additional needs. Patient shared she is interested in working part-time, however, would prefer to wait to locate employment until housing is established. Validated patients wishes. Patient shared she receives SSDI, therefore, she is only able to work a few hours a week. Offered to provided patient with additional support and resources if/when ready to seek employment. Patient in agreement with plan. No further CC needs identified at this time. Please refer to letter sent 10/17/2023 for further information regarding resources provided during this encounter. Patient in  agreement with care coordination services and monthly outreaches. Created patient centered goals and sent to patient via mail.    Resources and Interventions:  Current Resources:   Supplies Currently Used at Home: None  Equipment Currently Used at Home: none  Employment Status: disabled  Advance Care Plan/Directive  Advanced Care Plans/Directives on file:: No  Referrals Placed: Community Resources, [facility] Resources     Care Plan:  Care Plan: Housing Instability       Problem: SDOH LACK OF STABLE HOUSING       Goal: Establish Stable Housing       Start Date: 10/16/2023 Expected End Date: 2/12/2024    This Visit's Progress: 10%    Priority: High    Note:     Barriers: difficulty navigating services, limited support.   Strengths: goal oriented, motivated  Patient expressed understanding of goal: Yes  Action steps to achieve this goal:  I will continue to lean on informal supports of my: family  I will review resources and supports sent to me via [employer]  I will outreach to supports and consider establishing with ones I might find beneficial.   I will contact my care team with questions, concerns, support needs.   I will use the clinic as a resource and I understand I can contact my clinic with 24/7 after hours services available.   I will continue to outreach to care coordination as needed for additional resources or supports.                              Care Plan: Community Resources       Problem: Lack of supportive services       Goal: Obtain a [facility]        Start Date: 10/16/2023 Expected End Date: 2/12/2024    This Visit's Progress: 10%    Priority: High    Note:     Barriers: difficulty navigating services, limited support.   Strengths: goal oriented, motivated  Patient expressed understanding of goal: Yes  Action steps to achieve this goal:  I will continue to lean on informal supports of my: family  I will review resources and supports sent to me via [employer]  I will outreach to supports and consider  establishing with ones I might find beneficial.  I will continue to outreach to care coordination as needed for additional resources or supports.                          Patient/Caregiver understanding: Patient reports understanding and denies any additional questions or concerns at this time. SW CC engaged in [employer] communication during encounter.    Outreach Frequency: monthly    Plan: Patient will review introduction to CC letter, resources, and Complex Care Plan sent via mail. SW CC will remain available for CC needs and will schedule a follow-up outreach in one month.     JOAO Martinez/[facility]  Social Work Care Coordinator  [facility], and [place]  Phone: [phone]

## 2023-10-16 NOTE — LETTER
[facility]  [address] [place] [address]    October 16, 2023      Althea Grant  [address] [place] [address]        Dear Althea,    I am a clinic care coordinator who works with the [facility]. I wanted to thank you for spending the time to talk with me.  Below is a description of clinic care coordination and how I can further assist you shall any additional needs arise.       The clinic care coordination team is made up of a registered nurse, , financial resource worker and community health worker who understand the health care system. The goal of clinic care coordination is to help you manage your health and improve access to the health care system. Our team works alongside your provider to assist you in determining your health and social needs. We can help you obtain health care and community resources, providing you with necessary information and education. We can work with you through any barriers and develop a care plan that helps coordinate and strengthen the communication between you and your care team.  Our services are voluntary and are offered without charge to you personally.    Please feel free to contact me with any questions or concerns regarding care coordination and what we can offer.      We are focused on providing you with the highest-quality healthcare experience possible.    Sincerely,     JOAO Martinez/[facility]  Social Work Care Coordinator  [facility], and [place]  Phone: [phone]      Enclosed: I have enclosed the resources we discussed over the phone below. Please review and call me with any questions. I have also enclosed a copy of the Patient Centered Plan of Care. This has helpful information and goals that we have talked about. Please keep this in an easy to access place to use as needed.   Resources    [place] Assessment:  https://[place].gov/dhs/people-we-serve/people-with-disabilities/services/home-community/programs-and-services/mnchoices.jsp  A person of any age with a disability or in need of long-term services and supports may use the MnCHOICES person-centered assessment and support-planning process to help make decisions about long-term services and support needs. MnCHOICES is an assessment and planning tool used by [facility], [facility] and managed-care organizations.  The MnCHOICES Assessment uses a person-centered approach to gather information and help you make decisions about your long-term services and supports. It assesses your general health, your ability to take care of routine daily tasks and any help you may receive from family and friends. This assessment is a free, in-person visit with a certified  and usually occurs where you live. Once your assessment is complete, you will receive a community support plan. The community support plan provides a summary of what the  discovered through the assessment process and identifies next steps based on your needs.  [facility]: Request a MnCHOICES intake assessment by filling out the online MnCHOICES Referral Form or by calling [phone].  https://www.[facility]/assistance-support/assistance/seniors/bdnwfwrbo-nxgkuu-fokeimoqtp       [employer]:   Overcome Criminal, Credit and Rental History Barriers in Your Housing Search. [employer] makes it more likely that a landlord will relax their screening criteria and rent to you.   https://www.housinglink.org/beyond-backgrounds      Rent Assistance:  Access short-term assistance  [employer]: [employer] offers special one-time assistance to help you pay your rent.  ([phone]  [employer]: [facility] has emergency assistance grants that can help you to pay your rent.  [employer]: [employer] offers Self Sufficiency [place] of up to $1,000 to cover one emergency  expense.  [address] [place] [address]: [address] [place] [address] helps people with rental assistance, screening for other available financial aid, resource navigation, and referrals, access to legal kiosks, and emergency food.   https://[address] [place] [address].org/resources/resource-centers/  Local Housing Authorities: These agencies may keep lists of local short-term rental assistance resources.  https://[employer].org/HousingResources  Secure long-term rental assistance  Subsidized housing: Local governments own and manage affordable rental properties. You must be eligible and add your name to a waiting list in order to be considered for public housing.    View waiting list at https://Arr[employer]celink.org/SubsidizedHousing/HousingAuthorityWaitingList  [place] Public [employer]: Disabled - wait list open as of 10/16/2023. https://[employer]ia[employer]IX.org/housing/cvd-tl-zgvow/disabled/  APPLY Online: portal.[employer]ia[employer].org  If you need assistance with registering your account or completing the pre-application process you can reach out to [facility] several ways for help.  Call: [phone] (8:00 AM - 4:30 PM, Monday through Friday)  Email: [email or web address]  Come visit [facility] s main office located at [address] [place] [phone] (8:00 AM - 4:30 PM, Monday through Friday)  Privately owned subsidized housing: Some property owners have a contract with [facility] to offer below-market rate rental units.  Contact [facility] for properties near you.  https://[employer].org/SubsidizedHousing  Section 8: The Housing Choice Voucher (Section 8) Program will pay most of the rent and utilities of a privately owned property. Section 8 makes payments directly to your landlord and you pay the difference.  https://Arradiancelink.org/SubsidizedHousing/Rmnqmnh1Vpzuxzf  Housing and Urban Development (HUD): [facility] provides long-term assistance for homeowners and for renters.  https://www.hud.gov/  USDA Rural Development Program: USDA helps people in rural areas with their  mortgage or rent.  State Assistance: States administer federal funds obtained through the HOME program to provide affordable housing to their residents.

## 2023-10-26 ENCOUNTER — PATIENT OUTREACH (OUTPATIENT)
Dept: CARE COORDINATION | Facility: CLINIC | Age: 43
End: 2023-10-26
Payer: MEDICAID

## 2023-10-26 NOTE — PROGRESS NOTES
Clinic Care Coordination Contact    [facility] received call/vm from patient requesting a return call to discuss housing assistance. [facility] spoke with patient and reviewed resources provided 10/16/2023. Patient expressed understanding and shared she contacted [facility] but was informed she will need to apply online for the waiting list. Patient shared she does not have access to a computer at home. [facility] reviewed local libraries usually have computers available that patient may utilize to complete application. Patient expressed understanding. Patient shared she also plans to contact [facility] again to inquire if they have a hard copy patient can complete/mail in. Validated patients plan. Allowed time and space for patient to voice any additional needs. No further CC needs identified at this time.     Plan: Patient will benefit from routine ongoing CC outreaches per standard workflow.    JOAO Martinez/RONI  Social Work Care Coordinator  [facility], and [place]  Phone: [phone]

## 2023-11-16 ENCOUNTER — PATIENT OUTREACH (OUTPATIENT)
Dept: CARE COORDINATION | Facility: CLINIC | Age: 43
End: 2023-11-16
Payer: MEDICAID

## 2023-11-16 NOTE — PROGRESS NOTES
Clinic Care Coordination Contact  Follow Up Progress Note      Assessment: Initial enrollment to Care Coordination on 10/10/2023 for psychosocial support/resources. Follow-up call to patient today to check-in and review plan of care. Per report, patient has attempted to contact housing resources provided during last outreach, however, has been unsuccessful. Patient/spouse continue to live with daughter while awaiting alternate housing. [facility] reviewed patient may have to join a wait list for housing assistance. Patient voiced understanding. Patient reports she is working with a debt/ and was recently pre-approved for a mobile home. [facility] inquired what agency above specialist is with. Patient unsure of agency and reported she received information in a mailing. [facility] attempted to review validity of letter over phone, however, patient unsure where it came from. [facility] strongly encouraged patient to share letter received with a trusted family member/friend to review validity. Patient expressed understanding and stated she would reach out to family for further assistance. Patients daughter also involved in above conversation and shared her boyfriends mother would be a reliable source to view information received in the mail. Validated patients/daughters plans regarding above. [facility] reviewed goal surrounding obtaining a MnCHOICES assessment. Patient shared she has not had time to contact [facility] for an assessment. [facility] reviewed process and provided contact information ([phone]) over the phone. Patient expressed understanding and stated she would call to arrange an assessment.     Care Gaps:  Health Maintenance Due   Topic Date Due    HEPATITIS B IMMUNIZATION (1 of 3 - 3-dose series) Never done    HIV SCREENING  Never done    HEPATITIS C SCREENING  Never done    PAP  01/20/2019    INFLUENZA VACCINE (1) 09/01/2023    COVID-19 Vaccine (4 - 2023-24 season) 09/01/2023   Not addressed during this encounter due to  nature of call.     Care Plans  Care Plan: Housing Instability       Problem: SDOH LACK OF STABLE HOUSING       Goal: Establish Stable Housing       Start Date: 10/16/2023 Expected End Date: 2/12/2024    This Visit's Progress: 20% Recent Progress: 10%    Priority: High    Note:     Barriers: difficulty navigating services, limited support.   Strengths: goal oriented, motivated  Patient expressed understanding of goal: Yes  Action steps to achieve this goal:  I will continue to lean on informal supports of my: family  I will review resources and supports sent to me via [employer]  I will outreach to supports and consider establishing with ones I might find beneficial.   I will contact my care team with questions, concerns, support needs.   I will use the clinic as a resource and I understand I can contact my clinic with 24/7 after hours services available.   I will continue to outreach to care coordination as needed for additional resources or supports.                              Care Plan: Community Resources       Problem: Lack of supportive services       Goal: Obtain a County        Start Date: 10/16/2023 Expected End Date: 2/12/2024    This Visit's Progress: 10%    Priority: High    Note:     Barriers: difficulty navigating services, limited support.   Strengths: goal oriented, motivated  Patient expressed understanding of goal: Yes  Action steps to achieve this goal:  I will continue to lean on informal supports of my: family  I will review resources and supports sent to me via [employer]  I will outreach to supports and consider establishing with ones I might find beneficial.  I will continue to outreach to care coordination as needed for additional resources or supports.                          Intervention/Education provided during outreach: Provided active listening and support to patient during contact. Allowed time and space for patient to voice any additional needs. No further needs identified at  this time. Patient continues to be in agreement with care coordination services and monthly outreaches.      Outreach Frequency: monthly, more frequently as needed    Plan: Patient to review resources and continue working with Care Coordination to achieve above goal(s). SW CC will remain available for CC needs and will schedule a follow-up outreach in one month.     JOAO Martinez/[facility]  Social Work Care Coordinator  [facility], [place], and [place]  Phone: [phone]

## 2023-12-18 ENCOUNTER — PATIENT OUTREACH (OUTPATIENT)
Dept: CARE COORDINATION | Facility: CLINIC | Age: 43
End: 2023-12-18
Payer: MEDICAID

## 2023-12-18 NOTE — PROGRESS NOTES
Clinic Care Coordination Contact  [facility]/Voicemail    Clinical Data: Care Coordinator Outreach  Outreach Documentation Number of Outreach Attempt   12/18/2023  12:49 PM 1     [facility] unable to leave message on patient's voicemail due to voicemail box not being set up.     Plan: Care Coordinator will try to reach patient again in 10 business days.    JOAO Martinez/[facility]  Social Work Care Coordinator  [facility] [place], [place], and [place]  Phone: [phone]

## 2024-01-03 ENCOUNTER — PATIENT OUTREACH (OUTPATIENT)
Dept: CARE COORDINATION | Facility: CLINIC | Age: 44
End: 2024-01-03
Payer: COMMERCIAL

## 2024-01-03 NOTE — PROGRESS NOTES
Clinic Care Coordination Contact  [facility]/Voicemail    Clinical Data: Care Coordinator Outreach  Outreach Documentation Number of Outreach Attempt   12/18/2023  12:49 PM 1   1/3/2024  11:46 AM 2     CC unable to reach patient due to patients phone number no longer being in service. Per chart review, patient is not enrolled in [employer], therefore, [facility] will send letter to patient via mail.      Plan: Care Coordinator sent unable to contact letter with care coordinator contact information via mail. Care Coordinator will await response from patient. If no response within 10-15 business days, [facility] will close CC program due to [facility].     JOAO Martinez/[facility]  Social Work Care Coordinator  [facility] [place], [place], and [place]  Phone: [phone]

## 2024-01-03 NOTE — LETTER
[facility]  [address] [place] [address]    January 3, 2024      Althea Grant  [address] [place] [address]          Dear Althea,    I have been attempting to reach you since our last contact. I would like to continue to work with you and provide any additional support you may need on achieving your health care related goals. I would appreciate if you would give me a call at [phone] to let me know if you would like to continue working together. I know that there are many things that can affect our ability to communicate and I hope we can continue to work together.    All of us at the [facility] are invested in your health and are here to assist you in meeting your goals.     Sincerely,    JOAO Martinez/[facility]  Social Work Care Coordinator  [facility], and [place]  Phone: [phone]

## 2024-01-08 ENCOUNTER — PATIENT OUTREACH (OUTPATIENT)
Dept: CARE COORDINATION | Facility: CLINIC | Age: 44
End: 2024-01-08
Payer: COMMERCIAL

## 2024-01-08 NOTE — PROGRESS NOTES
Clinic Care Coordination Contact  Follow Up Progress Note      Assessment: Initial enrollment to Care Coordination on 10/10/2023 for psychosocial support/resources. [facility] received call from patient 1/6/2024 sharing she has a new phone/number and requested a return call. [facility] connected with patient today to check-in and review plan of care. Patient shared she is still in the process of obtaining housing. [facility] reviewed Housing Stabilization program, in which patient expressed interest. Reviewed patient will need a Professional Statement of Need form completed by PCP. Patient requested to meet with PCP in person to complete form. [facility] validated patients request and assisted with scheduling appointment via central scheduling. Patients contact information was updated during scheduling process and  appointment was scheduled with PCP for 2/15/2024. Patient will meet with [facility] following above to complete VALORIE and referral form for [employer]. Patient expressed understanding and agreement with plan. Patient shared daughter, Lilliam, will provide transportation to/from appointments. Patient requesting CC updated daughters contact information in the event CC needs to reach Lilliam. Offered to update contact information, however, reviewed there is no CTC on file for patients daughter. Patient expressed understanding and agreement with plan. Offered to send blank CTC to patient via mail to be returned upon completion. Patient expressed understanding and agreement with plan. Blank Professional Statement of Need form also sent to patient via mail to complete patient portion prior to above appointment. Patient in agreement with plan. FYI sent to PCP regarding above.     Care Gaps:  Health Maintenance Due   Topic Date Due    HEPATITIS B IMMUNIZATION (1 of 3 - 3-dose series) Never done    HIV SCREENING  Never done    HEPATITIS C SCREENING  Never done    PAP  01/20/2019    INFLUENZA VACCINE (1) 09/01/2023     COVID-19 Vaccine (4 - 2023-24 season) 09/01/2023    PHQ-2 (once per calendar year)  01/01/2024   Scheduled to be addressed during on going PCP follow up appointments.     Care Plans  Care Plan: Housing Instability       Problem: SDOH LACK OF STABLE HOUSING       Goal: Establish Stable Housing       Start Date: 10/16/2023 Expected End Date: 3/11/2024    This Visit's Progress: 30% Recent Progress: 20%    Priority: High    Note:     Barriers: difficulty navigating services, limited support.   Strengths: goal oriented, motivated  Patient expressed understanding of goal: Yes  Action steps to achieve this goal:  I will continue to lean on informal supports of my: family  I will review resources and supports sent to me via [employer]  I will outreach to supports and consider establishing with ones I might find beneficial.   I will contact my care team with questions, concerns, support needs.   I will use the clinic as a resource and I understand I can contact my clinic with 24/7 after hours services available.   I will continue to outreach to care coordination as needed for additional resources or supports.                              Care Plan: Community Resources       Problem: Lack of supportive services       Goal: Obtain a County        Start Date: 10/16/2023 Expected End Date: 3/13/2024    This Visit's Progress: 20% Recent Progress: 10%    Priority: High    Note:     Barriers: difficulty navigating services, limited support.   Strengths: goal oriented, motivated  Patient expressed understanding of goal: Yes  Action steps to achieve this goal:  I will continue to lean on informal supports of my: family  I will review resources and supports sent to me via [employer]  I will outreach to supports and consider establishing with ones I might find beneficial.  I will continue to outreach to care coordination as needed for additional resources or supports.                          Intervention/Education provided during  outreach: Provided active listening and support to patient during contact. Allowed time and space for patient to voice any additional needs. No further needs identified at this time. Patient continues to be in agreement with CC services and monthly outreaches.      Outreach Frequency: monthly, more frequently as needed    Plan: Patient to continue working with Care Coordination to achieve above goal(s).  CC will remain available for CC needs and will schedule a follow-up outreach in one month (2/15/2024).     JOAO Martinez/LICSW  Social Work Care Coordinator  [facility], and [place]  Phone: [phone]

## 2024-01-08 NOTE — LETTER
[facility]  [address] [place] [address]    January 9, 2024      Althea Grant  [address] [place] [address]          Dear Althea,    Thank you for taking time to speak with me on Monday. I have enclosed the documents we discussed over the phone. Please review and call me with any questions.     Form 1: Person to Person Communication   This is the form that needs to be completed if you would like to give us permission to speak with Lilliam on your behalf.     Form 2: Professional Statement of Need  This is the form that you will want to sign/date on page one and bring with you to your appointment with ISAEL Tapia on February, 15th 2024. Pages 2-4 will need to be completed by a qualified professional so please leave those blank.     Sincerely,     JOAO Martinez/LICSW  Social Work Care Coordinator  [facility] - [place], [place], and [place]  Phone: [phone]

## 2024-01-22 ENCOUNTER — PATIENT OUTREACH (OUTPATIENT)
Dept: CARE COORDINATION | Facility: CLINIC | Age: 44
End: 2024-01-22
Payer: COMMERCIAL

## 2024-01-22 NOTE — PROGRESS NOTES
Clinic Care Coordination Contact  Follow Up Progress Note      Assessment: [facility] received call/vm from patient requesting return call. [facility] connected with patient whom shared she has confirmed transportation to appointment 2/15/2024. Patient shared her friend will provide transportation to/from appointment. [facility] validated patients plan. Patient inquired if CC will assist with locating housing options. [facility] reviewed purpose of in person appointment is to complete paperwork for Housing Stabilization Services (HSS) in which HSS agency will assist patient in housing search. Patient expressed understanding. Patient plans to attend in person appointments with NP and CC on 2/15/2024.     Care Gaps:  Health Maintenance Due   Topic Date Due    HEPATITIS B IMMUNIZATION (1 of 3 - 3-dose series) Never done    HIV SCREENING  Never done    HEPATITIS C SCREENING  Never done    PAP  01/20/2019    INFLUENZA VACCINE (1) 09/01/2023    COVID-19 Vaccine (4 - 2023-24 season) 09/01/2023    PHQ-2 (once per calendar year)  01/01/2024   Scheduled to be addressed during on going PCP follow up appointments.     Care Plans  Care Plan: Housing Instability       Problem: SDOH LACK OF STABLE HOUSING       Goal: Establish Stable Housing       Start Date: 10/16/2023 Expected End Date: 3/11/2024    This Visit's Progress: 30% Recent Progress: 20%    Priority: High    Note:     Barriers: difficulty navigating services, limited support.   Strengths: goal oriented, motivated  Patient expressed understanding of goal: Yes  Action steps to achieve this goal:  I will continue to lean on informal supports of my: family  I will review resources and supports sent to me via [employer]  I will outreach to supports and consider establishing with ones I might find beneficial.   I will contact my care team with questions, concerns, support needs.   I will use the clinic as a resource and I understand I can contact my clinic with 24/7 after hours services available.    I will continue to outreach to care coordination as needed for additional resources or supports.                              Care Plan: Community Resources       Problem: Lack of supportive services       Goal: Obtain a [facility]        Start Date: 10/16/2023 Expected End Date: 3/13/2024    This Visit's Progress: 20% Recent Progress: 10%    Priority: High    Note:     Barriers: difficulty navigating services, limited support.   Strengths: goal oriented, motivated  Patient expressed understanding of goal: Yes  Action steps to achieve this goal:  I will continue to lean on informal supports of my: family  I will review resources and supports sent to me via [employer]  I will outreach to supports and consider establishing with ones I might find beneficial.  I will continue to outreach to care coordination as needed for additional resources or supports.                          Intervention/Education provided during outreach: Provided active listening and support to patient during contact. Allowed time and space for patient to voice any additional needs. No further needs identified at this time. Patient continues to be in agreement with CC services and monthly outreaches.     Outreach Frequency: monthly, more frequently as needed    Plan: Patient to continue working with Care Coordination to achieve above goal(s).  CC will remain available for CC needs and will follow-up with patient in person on 2/15/2024.      JOAO Martinez/LICSW  Social Work Care Coordinator  [facility], [place], and [place]  Phone: [phone]

## 2024-02-15 ENCOUNTER — PATIENT OUTREACH (OUTPATIENT)
Dept: CARE COORDINATION | Facility: CLINIC | Age: 44
End: 2024-02-15

## 2024-02-15 NOTE — PROGRESS NOTES
Clinic Care Coordination Contact    Clinical Data: Care Coordinator Outreach  Outreach Documentation Number of Outreach Attempt   2/15/2024  10:33 AM 1     Briefly spoke with patient whom shared she was unable to meet with PCP and/or SWCC in person on this day due to weather impacting travel. SWCC offered to assist with rescheduling in person appointments. Patient requested CC contact her tomorrow 2/16/2024 at 9:00am to discuss follow-up plans further. No additional CC needs identified at this time.     Plan: Care Coordinator will try to reach patient again at above date/time.     JOAO Martinez/[facility] Hospital[facility]  Social Work Care Coordinator  [facility] - [place], [place], and [place]  Phone: [phone]

## 2024-02-16 ENCOUNTER — PATIENT OUTREACH (OUTPATIENT)
Dept: NURSING | Facility: CLINIC | Age: 44
End: 2024-02-16
Payer: COMMERCIAL

## 2024-02-16 NOTE — PROGRESS NOTES
Clinic Care Coordination Contact  Follow Up Progress Note      Assessment: Initial enrollment to Care Coordination on 10/10/2023 for psychosocial support/resources. Follow-up call to patient today to check-in and review plan of care. Reviewed PCP appointment scheduled for 4/1/2024 and inquired if patient would like to see PCP sooner. Patient voiced preference is to keep 4/1/2024 appointment due to potential winter weather in February and March. Reviewed without PCP appointment, CC unable to submit Housing Stabilization Services referral. Patient expressed understanding and voiced she is okay with waiting until 4/1/2024. [facility] reviewed CC will meet with patient in person after PCP appointment to complete VALORIE and HSS referral. Patient expressed understanding and agreement with plan. [facility] appointment scheduled for 4/1/2024 at 11:30am.     Care Gaps:  Health Maintenance Due   Topic Date Due    HEPATITIS B IMMUNIZATION (1 of 3 - 3-dose series) Never done    HIV SCREENING  Never done    HEPATITIS C SCREENING  Never done    PAP  01/20/2019    INFLUENZA VACCINE (1) 09/01/2023    COVID-19 Vaccine (4 - 2023-24 season) 09/01/2023    PHQ-2 (once per calendar year)  01/01/2024   Scheduled to be addressed during on going PCP follow up appointments.     Care Plans  Care Plan: Housing Instability       Problem: SDOH LACK OF STABLE HOUSING       Goal: Establish Stable Housing       Start Date: 10/16/2023 Expected End Date: 5/31/2024    Recent Progress: 30%    Priority: High    Note:     Barriers: difficulty navigating services, limited support.   Strengths: goal oriented, motivated  Patient expressed understanding of goal: Yes  Action steps to achieve this goal:  I will continue to lean on informal supports of my: family  I will review resources and supports sent to me via [employer]  I will outreach to supports and consider establishing with ones I might find beneficial.   I will contact my care team with questions, concerns, support  needs.   I will use the clinic as a resource and I understand I can contact my clinic with 24/7 after hours services available.   I will continue to outreach to care coordination as needed for additional resources or supports.                              Care Plan: Community Resources       Problem: Lack of supportive services       Goal: Obtain a County        Start Date: 10/16/2023 Expected End Date: 5/24/2024    Recent Progress: 20%    Priority: High    Note:     Barriers: difficulty navigating services, limited support.   Strengths: goal oriented, motivated  Patient expressed understanding of goal: Yes  Action steps to achieve this goal:  I will continue to lean on informal supports of my: family  I will review resources and supports sent to me via [employer]  I will outreach to supports and consider establishing with ones I might find beneficial.  I will continue to outreach to care coordination as needed for additional resources or supports.                          Intervention/Education provided during outreach: Provided active listening and support to patient during contact. Allowed time and space for patient to voice any additional needs. Patient shared daughter, Lilliam, is interested in establishing employment and inquired if CC has any resources. [facility] briefly reviewed [employer] and offered to send additional information to patient via mail. Patient thankful for above and requested writer address information to daughter, Lilliam. Patient also shared her son whom lives with her mother-in-law will be moving to a group home in the near future and inquired if CC can assist with locating a facility. [facility] strongly encouraged patient/family to discuss process and/or options further with patients sons care team. Patient expressed understanding and agreement with plan. No further CC needs identified at this time. Patient continues to be in agreement with care coordination services and monthly  outreaches.      Outreach Frequency: monthly, more frequently as needed    Plan: Patient to continue working with Care Coordination to achieve above goal(s). SW CC will remain available for CC needs and will schedule a follow-up outreach in one month to confirm transportation to 4/1/2024 appointments.     JOAO Martinez/[facility]  Social Work Care Coordinator  [facility] [place], [place], and [place]  Phone: [phone]

## 2024-02-16 NOTE — LETTER
[facility]  [address] [place] [address]    February 16, 2024      Althea Y Juanita  [address] [place] [address]          Dear Raya Oh is an updated Patient Centered Plan of Care for your continued enrollment in Care Coordination. Please let us know if you have additional questions, concerns, or goals that we can assist with.    Sincerely,    Selina Templeton, MSW/[facility] Hospital[facility]  Social Work Care Coordinator  [facility] - [place], [place], and [place]  Phone: [phone]      [facility]  Patient Centered Plan of Care  About Me:        Patient Name:  Althea Grant    YOB: 1980  Age:         43 year old   [place] MRN:    [id number] Telephone Information:  Home Phone [phone]   Mobile [phone]   Mobile Not on file.       Address:  [address]  [place] [address] Email address:  [email or web address]      Emergency Contact(s)    Name Relationship Lgl Grd Work Phone Home Phone Mobile Phone   1. MARYZEHRAYRN HAMMOND* Daughter No   [phone]   2. JEFF PEREIRA Son-in-Law No   [phone]           Primary language:  English     needed? No   [employer]:  [phone] op. 1  Other communication barriers:None  Preferred Method of Communication:  Mail  Current living arrangement: I live in a private home with family  Mobility Status/ Medical Equipment: Independent    Health Maintenance  Health Maintenance Reviewed: Due/Overdue   Health Maintenance Due   Topic Date Due    HEPATITIS B IMMUNIZATION (1 of 3 - 3-dose series) Never done    HIV SCREENING  Never done    HEPATITIS C SCREENING  Never done    PAP  01/20/2019    INFLUENZA VACCINE (1) 09/01/2023    COVID-19 Vaccine (4 - 2023-24 season) 09/01/2023    PHQ-2 (once per calendar year)  01/01/2024     My Access Plan  Medical Emergency 911   Primary Clinic Line     24 Hour Appointment Line [phone] or  [phone]  [phone]) (toll-free)   24 Hour Nurse Line [phone] (toll-free)   Preferred Urgent Care No data recorded   Preferred Hospital [facility]  [phone]     Preferred Pharmacy [facility] [address] [place], [place] [address] [facility]     Behavioral Health Crisis Line The National Suicide Prevention Lifeline at 1-390.450.1964 or Text/Call 988     My Care Team Members  Patient Care Team         Relationship Specialty Notifications Start End    No Ref-Primary, Physician PCP - General   5/24/23     Fax: [phone]         Mar Coles, APRN CNP Assigned PCP   5/27/23     Phone: [phone] Fax: [phone]         [address] [facility] [address]    Selina Templeton, [facility] Lead Care Coordinator  - Clinical Admissions 10/10/23     Phone: [phone] Fax: [phone]                  My Care Plans  Self Management and Treatment Plan    Care Plan  Care Plan: Housing Instability       Problem: SDOH LACK OF STABLE HOUSING       Goal: Establish Stable Housing       Start Date: 10/16/2023 Expected End Date: 5/31/2024    Recent Progress: 30%    Priority: High    Note:     Barriers: difficulty navigating services, limited support.   Strengths: goal oriented, motivated  Patient expressed understanding of goal: Yes  Action steps to achieve this goal:  I will continue to lean on informal supports of my: family  I will review resources and supports sent to me via [employer]  I will outreach to supports and consider establishing with ones I might find beneficial.   I will contact my care team with questions, concerns, support needs.   I will use the clinic as a resource and I understand I can contact my clinic with 24/7 after hours services available.   I will continue to outreach to care coordination as needed for additional resources or supports.                              Care Plan: Community Resources       Problem: Lack of supportive services       Goal: Obtain a  [facility]        Start Date: 10/16/2023 Expected End Date: 5/24/2024    Recent Progress: 20%    Priority: High    Note:     Barriers: difficulty navigating services, limited support.   Strengths: goal oriented, motivated  Patient expressed understanding of goal: Yes  Action steps to achieve this goal:  I will continue to lean on informal supports of my: family  I will review resources and supports sent to me via [employer]  I will outreach to supports and consider establishing with ones I might find beneficial.  I will continue to outreach to care coordination as needed for additional resources or supports.                            Advance Care Plans/Directives:   Advanced Care Plan/Directives on file:   No       My Medical and Care Information  Problem List   Patient Active Problem List   Diagnosis    CARDIOVASCULAR SCREENING; LDL GOAL LESS THAN 160    Acne vulgaris        Current Medications and Allergies:   No current outpatient medications on file.     No current facility-administered medications for this visit.     No Known Allergies    Care Coordination Start Date: 10/6/2023   Frequency of Care Coordination: monthly, more frequently as needed     Form Last Updated: 02/16/2024

## 2024-02-16 NOTE — LETTER
[facility]  [address] [place] [address]    February 16, 2024      Lilliam Grant  [address]  [place] [address]          Dear Althea/Lilliam,    Thank you for taking time out of your day to speak with me. I have enclosed the resources we discussed over the phone below. Please review and call me with any questions.    Sincerely,     JOAO Martinez/LICSW  Social Work Care Coordinator  [facility] - [place], [place], and [place]  Phone: [phone]      Resources    [employer]:   With close to 50 [employer] locations throughout the [facility], you're sure to find one that fits your career development or talent matching needs. [employer] is committed to helping individuals start, advance or change their career. As a collaborative group of private, public and nonprofit partners, they offer an impressive range of innovative services, including one-on-one guidance, training, networking, labor market information and much more. https://www.[employer].com/  Get help with your job search, research companies or register for a workshop. Most of the services are provided at no charge!  Individual meetings with professional staff - just drop in or schedule an appointment online   Resume reviews to tailor your resume to your job goals   Interview practice to help you get that job   Workshops to connect with other job seekers and keep current on hiring trends   Opportunities to meet employers in-person   Training programs and schools - with specialized programs matching in-demand occupations   Labor market information - customized by region and industry  Locations:   [place] [phone]   [address] [facility] [address], [place] [address]  [place] [phone] [address], [place] [address] ([facility])

## 2024-03-13 ENCOUNTER — PATIENT OUTREACH (OUTPATIENT)
Dept: CARE COORDINATION | Facility: CLINIC | Age: 44
End: 2024-03-13
Payer: COMMERCIAL

## 2024-03-13 NOTE — PROGRESS NOTES
Clinic Care Coordination Contact  [facility]/Voicemail    Clinical Data: Care Coordinator Outreach  Outreach Documentation Number of Outreach Attempt   3/13/2024   1:42 PM 1     Left message on patient's voicemail with call back information and requested return call.    Plan: Care Coordinator will try to reach patient again in 10 business days.    JOAO Martinez/[facility]  Social Work Care Coordinator  [facility] - [place], [place], and [place]  Phone: [phone]

## 2024-03-14 ENCOUNTER — PATIENT OUTREACH (OUTPATIENT)
Dept: CARE COORDINATION | Facility: CLINIC | Age: 44
End: 2024-03-14
Payer: COMMERCIAL

## 2024-03-14 NOTE — PROGRESS NOTES
Clinic Care Coordination Contact  Follow Up Progress Note      Assessment: Initial enrollment to Care Coordination on 10/10/2023 for psychosocial support/resources. [facility] received incoming call from patient on this day. Patient voiced questions surrounding food voucher program. Patient reports she is in the process of completing forms, however, is missing a form. [facility] strongly encouraged patient to contact [facility] line for further assistance regarding above. Patient expressed understanding and agreement with plan. [facility] provided patient with contact information for above ([phone]). Patient thankful for information and shared she plans to contact them this afternoon. [facility] reviewed upcoming visit with complete VALORIE and HSS referral. Patient confirmed plans and shared her mother-in-law will be providing transportation.     Care Gaps:  Health Maintenance Due   Topic Date Due    HIV SCREENING  Never done    HEPATITIS C SCREENING  Never done    HEPATITIS B IMMUNIZATION (1 of 3 - 19+ 3-dose series) Never done    PAP  01/20/2019    INFLUENZA VACCINE (1) 09/01/2023    COVID-19 Vaccine (4 - 2023-24 season) 09/01/2023    PHQ-2 (once per calendar year)  01/01/2024   Scheduled to be addressed during on going PCP follow up appointments.     Care Plans  Care Plan: Housing Instability       Problem: SDOH LACK OF STABLE HOUSING       Goal: Establish Stable Housing       Start Date: 10/16/2023 Expected End Date: 7/26/2024    This Visit's Progress: 30% Recent Progress: 30%    Priority: High    Note:     Barriers: difficulty navigating services, limited support.   Strengths: goal oriented, motivated  Patient expressed understanding of goal: Yes  Action steps to achieve this goal:  I will continue to lean on informal supports of my: family  I will review resources and supports sent to me via [employer]  I will outreach to supports and consider establishing with ones I might find beneficial.   I will contact my care  team with questions, concerns, support needs.   I will use the clinic as a resource and I understand I can contact my clinic with 24/7 after hours services available.   I will continue to outreach to care coordination as needed for additional resources or supports.                              Care Plan: Community Resources       Problem: Lack of supportive services       Goal: Obtain a County        Start Date: 10/16/2023 Expected End Date: 7/26/2024    This Visit's Progress: 20% Recent Progress: 20%    Priority: High    Note:     Barriers: difficulty navigating services, limited support.   Strengths: goal oriented, motivated  Patient expressed understanding of goal: Yes  Action steps to achieve this goal:  I will continue to lean on informal supports of my: family  I will review resources and supports sent to me via "[employer]"  I will outreach to supports and consider establishing with ones I might find beneficial.  I will continue to outreach to care coordination as needed for additional resources or supports.                          Intervention/Education provided during outreach: Provided active listening and support to patient during contact. Allowed time and space for patient to voice any additional needs. No further CC needs identified at this time. Patient continues to be in agreement with care coordination services and monthly outreaches.       Outreach Frequency: monthly, more frequently as needed    Plan: Patient to continue working with Care Coordination to achieve above goal(s).  CC will remain available for CC needs and will meet with patient in person after PCP appointment 4/1/2024 to complete VALORIE and HSS referral.     JOAO Martinez/[facility] CenterSW  Social Work Care Coordinator  [facility], [place], and [place]  Phone: [phone]

## 2024-03-28 ENCOUNTER — PATIENT OUTREACH (OUTPATIENT)
Dept: CARE COORDINATION | Facility: CLINIC | Age: 44
End: 2024-03-28
Payer: COMMERCIAL

## 2024-03-28 NOTE — PROGRESS NOTES
Clinic Care Coordination Contact    [facility] received incoming call from patient. Patient shared she plans to attend 4/1/2024 appointments with PCP and [facility], however, shared she does not have time for a PAP on 4/1/2024. Patient voiced she would like to focus on housing goal. CC reviewed patient needs to meet with PCP to complete referral paperwork. Patient expressed understanding, however, requested PAP appointment be scheduled for another date/time. [facility] offered to pass request onto PCP/care team for further direction/guidance. Patient in agreement with plan. Allowed time and space for patient to voice any additional needs. Patient shared she has transportation to 4/1/2024 appointment, however, inquired if there are additional transportation services for appointments. [facility] reviewed [facility] Transportation benefits and offered to provided additional information on above at 4/1/2024 appointment. Patient expressed understanding and agreement with plan. Patient confirmed she does have transportation to upcoming appointment. No further CC needs identified at this time. [facility] will remain available for CC needs and will meet with patient in person after PCP appointment 4/1/2024 to complete VALORIE and HSS referral.       JOAO Martinez/LICSW  Social Work Care Coordinator  [facility], and [place]  Phone: [phone]

## 2024-04-01 ENCOUNTER — OFFICE VISIT (OUTPATIENT)
Dept: INTERNAL MEDICINE | Facility: CLINIC | Age: 44
End: 2024-04-01
Payer: COMMERCIAL

## 2024-04-01 ENCOUNTER — ALLIED HEALTH/NURSE VISIT (OUTPATIENT)
Dept: NURSING | Facility: CLINIC | Age: 44
End: 2024-04-01

## 2024-04-01 VITALS
WEIGHT: 158 LBS | SYSTOLIC BLOOD PRESSURE: 132 MMHG | RESPIRATION RATE: 16 BRPM | HEIGHT: 60 IN | OXYGEN SATURATION: 100 % | TEMPERATURE: 98.3 F | DIASTOLIC BLOOD PRESSURE: 87 MMHG | HEART RATE: 92 BPM | BODY MASS INDEX: 31.02 KG/M2

## 2024-04-01 DIAGNOSIS — F81.9 LEARNING DISABILITY: ICD-10-CM

## 2024-04-01 DIAGNOSIS — Z00.00 ENCOUNTER FOR PREVENTIVE HEALTH EXAMINATION: Primary | ICD-10-CM

## 2024-04-01 DIAGNOSIS — Z12.4 CERVICAL CANCER SCREENING: ICD-10-CM

## 2024-04-01 DIAGNOSIS — Z71.89 COUNSELING AND COORDINATION OF CARE: Primary | ICD-10-CM

## 2024-04-01 PROCEDURE — 87624 HPV HI-RISK TYP POOLED RSLT: CPT

## 2024-04-01 PROCEDURE — G0145 SCR C/V CYTO,THINLAYER,RESCR: HCPCS

## 2024-04-01 PROCEDURE — 99207 PR NO CHARGE NURSE ONLY: CPT | Performed by: INTERNAL MEDICINE

## 2024-04-01 PROCEDURE — 99396 PREV VISIT EST AGE 40-64: CPT

## 2024-04-01 NOTE — PROGRESS NOTES
Clinic Care Coordination Contact  Follow Up Progress Note      Assessment: Initial enrollment to Care Coordination on 10/10/2023 for psychosocial support/resources. Follow-up visit with patient and daughterLilliam, today to check-in and review plan of care. Reviewed Housing Stabilization Services (HSS) referral and completed VALORIE for [facility] with patient. HSS referral sent via e-mail to Brain with [employer] [facility]. [facility] reviewed HSS process with patient and offered to follow-up with patient later this week to confirm HSS referral was received by Kristian at [employer] [facility]. Patient expressed understanding and agreement with plan. [facility] reviewed patients transportation benefits ([employer]), as previously discussed 3/28/2024, and provided contact information to schedule rides. Patient expressed understanding and thanked writer for information. [facility] reviewed purpose of a consent to communicate form and inquired if patient would like to list daughter, Lilliam, whom has been involved in patients care. Patient voiced interest in above and consent to communicate form was completed on this date. Completed form sent to medical records to be uploaded in the chart 4/1/2024.    Care Gaps:  Health Maintenance Due   Topic Date Due    HIV SCREENING  Never done    HEPATITIS C SCREENING  Never done    HEPATITIS B IMMUNIZATION (1 of 3 - 19+ 3-dose series) Never done    PAP  01/20/2019    INFLUENZA VACCINE (1) 09/01/2023    COVID-19 Vaccine (4 - 2023-24 season) 09/01/2023   Scheduled to be addressed during on going PCP follow up appointments.     Care Plans  Care Plan: Housing Instability       Problem: SDOH LACK OF STABLE HOUSING       Goal: Establish Stable Housing       Start Date: 10/16/2023 Expected End Date: 7/26/2024    This Visit's Progress: 50% Recent Progress: 30%    Priority: High    Note:     Barriers: difficulty navigating services, limited support.    Strengths: goal oriented, motivated  Patient expressed understanding of goal: Yes  Action steps to achieve this goal:  I will continue to lean on informal supports of my: family  I will review resources and supports sent to me via [employer]  I will outreach to supports and consider establishing with ones I might find beneficial.   I will contact my care team with questions, concerns, support needs.   I will use the clinic as a resource and I understand I can contact my clinic with 24/7 after hours services available.   I will continue to outreach to care coordination as needed for additional resources or supports.                              Care Plan: Community Resources       Problem: Lack of supportive services       Goal: Obtain a County        Start Date: 10/16/2023 Expected End Date: 7/26/2024    This Visit's Progress: 20% Recent Progress: 20%    Priority: High    Note:     Barriers: difficulty navigating services, limited support.   Strengths: goal oriented, motivated  Patient expressed understanding of goal: Yes  Action steps to achieve this goal:  I will continue to lean on informal supports of my: family  I will review resources and supports sent to me via [employer]  I will outreach to supports and consider establishing with ones I might find beneficial.  I will continue to outreach to care coordination as needed for additional resources or supports.                          Intervention/Education provided during outreach: Provided active listening and support to patient during contact. Allowed time and space for patient to voice any additional needs. No further CC needs identified at this time. Please refer below for resources provided in person during this encounter. Patient continues to be in agreement with care coordination services and monthly outreaches.    Resources   [employer] provides no-cost transportation to and from your covered medical, dental and pharmacy  visits.  How do I set up transportation with [facility] Health Ride?  Call [phone] to request a bus pass or schedule a ride. Health Ride is open 7 am - 8 pm, Monday - Friday.  If you do not speak English or are hearing impaired, please contact [facility] Customer Service.  Have the following information ready:  [employer] member ID card  Name, address on file and date of birth  Current phone number  Doctor, clinic, dentist, pharmacy or other care provider full name and address  Using taxi, volunteer rider and special transportation:  Pick-ups:  Pick-up locations can be at your home, school, work or shelter  Pick-up time is up to 60 minutes before your appointment  The  will wait 5 minutes at your pick-up location  The  will give you a return slip with the phone number to call for your return ride  Return rides:  Call the number on your return slip to activate your return ride  Identify yourself as a [facility] member  The  will wait 5 minutes at your pick-up location  Return rides may take longer to arrive, depending on traffic and weather  Where can I go?  You can go to these types of visits:  Medical  Dental  Mental health  Substance use disorder  Pharmacy  Durable medical equipment  In case of emergency that needs immediate attention, call 911.  Reasons a ride may be denied  Requested ride location is for a non-covered service  Your appointment could not be verified  Personal information could not be verified  No active medical coverage  You have access to a working vehicle  Health Ride does limit distance  Your primary care appointment can be a maximum of 30 miles from your home  Your specialty care appointment can be a maximum of 60 miles from your home.  If you live in a rural area without a primary care clinic within 30 miles or a specialty care clinic within 60 miles, call Health Ride to discuss.       Outreach Frequency: monthly, more frequently as needed    Plan: Patient to review resources and  continue working with Care Coordination to achieve above goal(s). SW CC will remain available for CC needs and will schedule a follow-up outreach in 1-2 days to check on status of HSS referral.     JOAO Martinez/[facility] Hospital[facility]  Social Work Care Coordinator  [facility], and [place]  Phone: [phone]

## 2024-04-01 NOTE — PROGRESS NOTES
"Preventive Care Visit  [facility]  Mar Coles, APRN [facility], Internal Medicine  Apr 1, 2024      Assessment & Plan     (Z00.00) Encounter for preventive health examination  (primary encounter diagnosis)  Comment: pt presents for annual  Plan:     (Z12.4) Cervical cancer screening  Comment:   Plan: Pap Screen with HPV - recommended age 30 - 65         years            (F81.9) Learning disability  Comment: Forms completed today for assistance with housing. Pt with limited support and difficulty navigating services. She is currently living with her daughter   Plan:           BMI  Estimated body mass index is 31.38 kg/m  as calculated from the following:    Height as of this encounter: 1.511 m (4' 11.5\").    Weight as of this encounter: 71.7 kg (158 lb).   Weight management plan: Discussed healthy diet and exercise guidelines          Jaswinder Oh is a 43 year old, presenting for the following:  No chief complaint on file.        5/24/2023     1:09 PM   Additional Questions   Roomed by Ashley    Accompanied by mother in-law Jody        Health Care Directive  Patient does not have a Health Care Directive or Living Will: Discussed advance care planning with patient; however, patient declined at this time.    History of Present Illness       Reason for visit:  Meet with the  and maybe get pep    She eats 0-1 servings of fruits and vegetables daily.She consumes 11 or more sweetened beverage(s) daily.She exercises with enough effort to increase her heart rate 9 or less minutes per day.  She exercises with enough effort to increase her heart rate 7 days per week.   She is taking medications regularly.                 No data to display                  5/24/2023   Nutrition   Three or more servings of calcium each day? Yes   Diet: other         5/24/2023   Exercise   Frequency of exercise: 6-7 days/week            5/24/2023   Dental   Dentist two times every year? Yes      " "      Today's PHQ-2 Score:       4/1/2024    10:12 AM   PHQ-2 ( 1999 Pfizer)   Q1: Little interest or pleasure in doing things 0   Q2: Feeling down, depressed or hopeless 0   PHQ-2 Score 0   Q1: Little interest or pleasure in doing things Not at all   Q2: Feeling down, depressed or hopeless Not at all   PHQ-2 Score 0           5/24/2023   Substance Use   Alcohol more than 3/day or more than 7/wk Not Applicable     Social History     Tobacco Use    Smoking status: Never   Vaping Use    Vaping Use: Never used   Substance Use Topics    Alcohol use: No    Drug use: No           6/28/2023   LAST FHS-7 RESULTS   1st degree relative breast or ovarian cancer No   Any relative bilateral breast cancer No   Any male have breast cancer No   Any ONE woman have BOTH breast AND ovarian cancer No   Any woman with breast cancer before 50yrs No   2 or more relatives with breast AND/OR ovarian cancer No   2 or more relatives with breast AND/OR bowel cancer No        Mammogram Screening - Mammogram every 1-2 years updated in Health Maintenance based on mutual decision making      History of abnormal Pap smear: NO - age 30-65 PAP every 5 years with negative HPV co-testing recommended        1/20/2016    12:00 AM   PAP / HPV   PAP (Historical) NIL      ASCVD Risk   The 10-year ASCVD risk score (Neisha ZARAGOZA, et al., 2019) is: 0.5%    Values used to calculate the score:      Age: 43 years      Sex: Female      Is Non- : No      Diabetic: No      Tobacco smoker: No      Systolic Blood Pressure: 132 mmHg      Is BP treated: No      HDL Cholesterol: 53 mg/dL      Total Cholesterol: 162 mg/dL         No data to display                 Reviewed and updated as needed this visit by Provider                         Objective    Exam  /87   Pulse 92   Temp 98.3  F (36.8  C) (Oral)   Resp 16   Ht 1.511 m (4' 11.5\")   Wt 71.7 kg (158 lb)   LMP 08/15/2023 (Approximate)   SpO2 100%   BMI 31.38 kg/m   " "  Estimated body mass index is 31.38 kg/m  as calculated from the following:    Height as of this encounter: 1.511 m (4' 11.5\").    Weight as of this encounter: 71.7 kg (158 lb).      Physical Exam  Constitutional:       General: She is not in acute distress.     Appearance: Normal appearance. She is not ill-appearing, toxic-appearing or diaphoretic.   HENT:      Head: Normocephalic and atraumatic.   Eyes:      Conjunctiva/sclera: Conjunctivae normal.   Cardiovascular:      Rate and Rhythm: Normal rate and regular rhythm.      Heart sounds: Normal heart sounds.   Pulmonary:      Effort: Pulmonary effort is normal.      Breath sounds: Normal breath sounds.   Chest:      Comments: Breasts: symmetric, skin intact, without lesions, no lymphadenopathy, no masses, non-tender bilaterally  Genitourinary:     Comments: PELVIC:   Vulva: normal external female genitalia,   Urethra meatus: normal, non-tender  Vagina: normal vaginal mucosa, rugated, no lesions, normal physiologic discharge.    Cervix: multiparous cervix, no lesions.    Uterus: Normal contour, size, position; non-tender  Perineum: normal, no lesions, intact  Skin:     General: Skin is warm and dry.   Neurological:      Mental Status: She is alert and oriented to person, place, and time.   Psychiatric:         Mood and Affect: Mood normal.         Behavior: Behavior normal.         Thought Content: Thought content normal.         Judgment: Judgment normal.           Signed Electronically by: ISAEL Ramirez CNP    "

## 2024-04-02 NOTE — PROGRESS NOTES
Clinic Care Coordination Contact    [facility] attempted to  [facility] 4/2/2024 to check on status of referral. Left message on [facility] voicemail with call back information and requested return call.    Plan: Care Coordinator will try to reach [facility] again in 1-2 business days.    JOAO Martinez/[facility]HEL  Social Work Care Coordinator  [facility] [place], and [place]  Phone: [phone]

## 2024-04-03 LAB
BKR LAB AP GYN ADEQUACY: NORMAL
BKR LAB AP GYN INTERPRETATION: NORMAL
BKR LAB AP HPV REFLEX: NORMAL
BKR LAB AP PREVIOUS ABNORMAL: NORMAL
PATH REPORT.COMMENTS IMP SPEC: NORMAL
PATH REPORT.COMMENTS IMP SPEC: NORMAL
PATH REPORT.RELEVANT HX SPEC: NORMAL

## 2024-04-03 NOTE — PROGRESS NOTES
Clinic Care Coordination Contact  Care Team Conversations    [facility] sent email to [facility] 4/3/2024 to check on status of referral. If no response, CC will try to reach [facility] again via phone in 3-5 business days.    JOAO Martinez/[facility]  Social Work Care Coordinator  [facility], and [place]  Phone: [phone]

## 2024-04-05 LAB
HUMAN PAPILLOMA VIRUS 16 DNA: NEGATIVE
HUMAN PAPILLOMA VIRUS 18 DNA: NEGATIVE
HUMAN PAPILLOMA VIRUS FINAL DIAGNOSIS: NORMAL
HUMAN PAPILLOMA VIRUS OTHER HR: NEGATIVE

## 2024-04-05 NOTE — PROGRESS NOTES
Clinic Care Coordination Contact    [facility] spoke with patient to provide update on transfer of CC services to new [facility], [place]. Patient expressed understanding and requested CC contact information. [facility] contact information provided on this day. Reviewed HSS referral was sent 4/1/2024. Patient shared she has not heard from HSS agency. Informed patient that CC has attempted to follow up with agency x 2. CC will make one more attempt to follow-up with [facility] regarding status of referral on 4/9/2024. Patient expressed understanding and agreement with plan. No further CC needs identified at this time.     Addendum:   [facility] received email from [facility] sharing referral was received and staff will be contacting patient to complete housing consultation. CC to follow-up with patient 4/9/2024 to check in regarding above.     JOAO Martinez/[facility]  Social Work Care Coordinator  [facility], and [place]  Phone: [phone]

## 2024-04-09 ENCOUNTER — PATIENT OUTREACH (OUTPATIENT)
Dept: CARE COORDINATION | Facility: CLINIC | Age: 44
End: 2024-04-09
Payer: COMMERCIAL

## 2024-04-09 ASSESSMENT — ACTIVITIES OF DAILY LIVING (ADL): DEPENDENT_IADLS:: TRANSPORTATION

## 2024-04-09 NOTE — PROGRESS NOTES
Clinic Care Coordination Contact  Follow Up Progress Note      Assessment: Followed up with pt on this date as requested by previous [facility]. Writer introduced self and explained reason for call.     [facility] inquired if pt has connected with HSS representatives as they had recently connected with previous [facility] to verify referral was received and indicated they would be in contact with pt. Pt indicates she has not heard from anyone at this time. CC encouraged she continue to check Vms to be sure she doesn't miss them. Pt agreed.     Pt then inquired if her insurance was updated in her record as she updated it with the clinic last week. CC reviewed record and confirmed it has not been updated yet. Pt states they said it could take up to 7 days to show. Pt asked CC to let her know if' writer notices if it's been updated. Writer will email or call pt if found to be updated.     No other new needs.     Care Gaps:    Health Maintenance Due   Topic Date Due    HIV SCREENING  Never done    HEPATITIS C SCREENING  Never done    HEPATITIS B IMMUNIZATION (1 of 3 - 19+ 3-dose series) Never done    INFLUENZA VACCINE (1) 09/01/2023    COVID-19 Vaccine (4 - 2023-24 season) 09/01/2023       Care Plans  Care Plan: Housing Instability       Problem: SDOH LACK OF STABLE HOUSING       Goal: Establish Stable Housing       Start Date: 10/16/2023 Expected End Date: 7/26/2024    This Visit's Progress: 50% Recent Progress: 50%    Priority: High    Note:     Barriers: difficulty navigating services, limited support.   Strengths: goal oriented, motivated  Patient expressed understanding of goal: Yes  Action steps to achieve this goal:  I will continue to lean on informal supports of my: family  I will review resources and supports sent to me via [employer]  I will outreach to supports and consider establishing with ones I might find beneficial.   I will contact my care team with questions, concerns, support needs.   I will use the clinic as a  resource and I understand I can contact my clinic with 24/7 after hours services available.   I will continue to outreach to care coordination as needed for additional resources or supports.                              Care Plan: Community Resources       Problem: Lack of supportive services       Goal: Obtain a County        Start Date: 10/16/2023 Expected End Date: 7/26/2024    This Visit's Progress: 20% Recent Progress: 20%    Priority: High    Note:     Barriers: difficulty navigating services, limited support.   Strengths: goal oriented, motivated  Patient expressed understanding of goal: Yes  Action steps to achieve this goal:  I will continue to lean on informal supports of my: family  I will review resources and supports sent to me via [employer]  I will outreach to supports and consider establishing with ones I might find beneficial.  I will continue to outreach to care coordination as needed for additional resources or supports.                              Intervention/Education provided during outreach: Reviewed process for HSS and email update from previous [facility].      Outreach Frequency: monthly, more frequently as needed    Plan: Pt to continue to work with HSS and be aware they will be calling to connect and finish consultation. Care Coordinator will follow up in 2-3 weeks.     [facility]row [facility] [place]  Clinic Care Coordinator  [facility]  [phone]  [email or web address]

## 2024-04-17 ENCOUNTER — PATIENT OUTREACH (OUTPATIENT)
Dept: CARE COORDINATION | Facility: CLINIC | Age: 44
End: 2024-04-17
Payer: COMMERCIAL

## 2024-04-17 ASSESSMENT — ACTIVITIES OF DAILY LIVING (ADL): DEPENDENT_IADLS:: TRANSPORTATION

## 2024-04-17 NOTE — PROGRESS NOTES
Clinic Care Coordination Contact  [facility]/Voicemail    Clinical Data: Care Coordinator Outreach    Outreach Documentation Number of Outreach Attempt   3/13/2024   1:42 PM 1   4/17/2024  10:29 AM 1       Received a VM from pt saying she has not heard anything from the S reps. [facility] outreached to HSS via email. S indicates they are still waiting for pts MA to be open in order to complete her consultation and offer supports. They provided contact numbers to call when pt MA is open.     [facility] outreached to pt to update and left a message explaining above. In last conversation pt shared she had updated her insurance. [facility] ran pt info through [employer]. MA is not active. Left message on patient's voicemail with call back information and requested return call.    Plan:  Care Coordinator will try to reach patient again in 10 business days.    Horacio Morrow [facility]  Clinic Care Coordinator  [facility]  [phone]  [email or web address]

## 2024-04-19 ENCOUNTER — PATIENT OUTREACH (OUTPATIENT)
Dept: CARE COORDINATION | Facility: CLINIC | Age: 44
End: 2024-04-19
Payer: COMMERCIAL

## 2024-04-19 ASSESSMENT — ACTIVITIES OF DAILY LIVING (ADL): DEPENDENT_IADLS:: TRANSPORTATION

## 2024-04-19 NOTE — PROGRESS NOTES
Clinic Care Coordination Contact  Follow Up Progress Note      Assessment: [facility] received call back from pt. Pt states she has been able to assure her MA is open and active. [facility] verified via [place] is active. 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    [facility] outreached to Mikayla Reina- S rep ([phone],[email or web address]). A message was left to share that pt MA is open and active. [facility] outreached to pt to provide Mikayla's contact number to pt for her to call as well- per Mikayla's request. Pt appreciated the call and says she will call Mikayla.     No new needs.     Care Gaps:    Health Maintenance Due   Topic Date Due    HIV SCREENING  Never done    HEPATITIS C SCREENING  Never done    HEPATITIS B IMMUNIZATION (1 of 3 - 19+ 3-dose series) Never done    INFLUENZA VACCINE (1) 09/01/2023    COVID-19 Vaccine (4 - 2023-24 season) 09/01/2023       Care Plans  Care Plan: Housing Instability       Problem: SDOH LACK OF STABLE HOUSING       Goal: Establish Stable Housing       Start Date: 10/16/2023 Expected End Date: 7/26/2024    This Visit's Progress: 50% Recent Progress: 50%    Priority: High    Note:     Barriers: difficulty navigating services, limited support.   Strengths: goal oriented, motivated  Patient expressed understanding of goal: Yes  Action steps to achieve this goal:  I will continue to lean on informal supports of my: family  I will review resources and supports sent to me via [employer]  I will outreach to supports and consider establishing with ones I might find beneficial.   I will contact my care team with questions, concerns, support needs.   I will use the clinic as a resource and I understand I can contact my clinic with 24/7 after hours services available.   I will continue to outreach to care coordination as needed for additional resources or supports.                              Care Plan: Community Resources       Problem: Lack of supportive services       Goal: Obtain a County        Start Date:  10/16/2023 Expected End Date: 7/26/2024    This Visit's Progress: 20% Recent Progress: 20%    Priority: High    Note:     Barriers: difficulty navigating services, limited support.   Strengths: goal oriented, motivated  Patient expressed understanding of goal: Yes  Action steps to achieve this goal:  I will continue to lean on informal supports of my: family  I will review resources and supports sent to me via [employer]  I will outreach to supports and consider establishing with ones I might find beneficial.  I will continue to outreach to care coordination as needed for additional resources or supports.                              Intervention/Education provided during outreach: Provided Mikayla Reina contact.      Outreach Frequency: monthly, more frequently as needed    Plan: Pt to continue to work with HSS for housing support. Care Coordinator will follow up in 2-3 weeks.     Horacio Morrow [facility]  Clinic Care Coordinator  [facility]  [phone]  [email or web address]

## 2024-04-30 ENCOUNTER — PATIENT OUTREACH (OUTPATIENT)
Dept: CARE COORDINATION | Facility: CLINIC | Age: 44
End: 2024-04-30
Payer: COMMERCIAL

## 2024-04-30 ASSESSMENT — ACTIVITIES OF DAILY LIVING (ADL): DEPENDENT_IADLS:: TRANSPORTATION

## 2024-04-30 NOTE — PROGRESS NOTES
Clinic Care Coordination Contact  [facility]/Voicemail    Clinical Data: Care Coordinator Outreach    Outreach Documentation Number of Outreach Attempt   4/17/2024  10:29 AM 1   4/30/2024   3:15 PM 1       [facility] received incoming VM from pt indicating she had a question. No additional information left. [facility] attempted to reach pt to follow up. Left message on patient's voicemail with call back information and requested return call.    Plan: If no reply from pt, Care Coordinator will try to reach patient again in 10 business days.    SHIRA Rodriguez  Clinic Care Coordinator  [facility]  [phone]  [email or web address]

## 2024-05-14 ENCOUNTER — PATIENT OUTREACH (OUTPATIENT)
Dept: CARE COORDINATION | Facility: CLINIC | Age: 44
End: 2024-05-14
Payer: COMMERCIAL

## 2024-05-14 ASSESSMENT — ACTIVITIES OF DAILY LIVING (ADL): DEPENDENT_IADLS:: TRANSPORTATION

## 2024-05-14 NOTE — LETTER
[facility] CARE COORDINATION  [address]  [facility] [address]    May 14, 2024        Althea JOSE Jameeky  [address] [place] [address]          Dear Raya Oh is an updated Patient Centered Plan of Care for your continued enrollment in Care Coordination. Please let us know if you have additional questions, concerns, or goals that we can assist with.    Sincerely,    Horacio Morrow [facility]  Clinic Care Coordinator  [facility]  [phone]  [email or web address]  Patient Centered Plan of Care  About Me:        Patient Name:  Althea Grant    YOB: 1980  Age:         43 year old   [place] MRN:    [id number] Telephone Information:  Home Phone [phone]   Mobile [phone]   Mobile Not on file.       Address:  [address]  [place] [address] Email address:  [email or web address]      Emergency Contact(s)    Name Relationship Lgl Grd Work Phone Home Phone Mobile Phone   1. MARYZEHRAYRN HAMMOND* Daughter No   [phone]   2. RANDALLJEFF Son-in-Law No   [phone]           Primary language:  English     needed? No   [place] [employer] [facility]:  [phone] op. 1  Other communication barriers:None    Preferred Method of Communication:  Mail  Current living arrangement: I live in a private home with family    Mobility Status/ Medical Equipment: Independent        Health Maintenance  Health Maintenance Reviewed: Due/Overdue   Health Maintenance Due   Topic Date Due    HIV SCREENING  Never done    HEPATITIS C SCREENING  Never done    HEPATITIS B IMMUNIZATION (1 of 3 - 19+ 3-dose series) Never done    COVID-19 Vaccine (4 - 2023-24 season) 09/01/2023           My Access Plan  Medical Emergency 911   Primary Clinic Line [facility] - [phone]   24 Hour Appointment Line [phone]  or  [phone]) (toll-free)   24 Hour Nurse Line [phone] (toll-free)   Preferred Urgent Care No data recorded   Preferred Hospital [facility]  [phone]     Preferred Pharmacy [facility] [address], [place] - [address] [facility]     Behavioral Health Crisis Line The National Suicide Prevention Lifeline at 1-145.629.4221 or Text/Call 548           My Care Team Members  Patient Care Team         Relationship Specialty Notifications Start End    Mar Coles, ISAEL CNP PCP - General Internal Medicine  4/1/24     Phone: [phone] Fax: [phone]         [address] [facility] [address]    Mar Coles APRN CNP Assigned PCP   5/27/23     Phone: [phone] Fax: [phone]         [address] [facility] [address]    Horacio Morrow LSW Clinic Care Coordinator Primary Care - [facility] Admissions 4/5/24     Phone: [phone]         Horacio Morrow LSW Lead Care Coordinator Primary Care - [facility] Admissions 4/5/24     Phone: [phone]                     My Care Plans  Self Management and Treatment Plan    Care Plan  Care Plan: Housing Instability       Problem: SDOH LACK OF STABLE HOUSING       Goal: Establish Stable Housing       Start Date: 10/16/2023 Expected End Date: 7/26/2024    This Visit's Progress: 60% Recent Progress: 50%    Priority: High    Note:     Barriers: difficulty navigating services, limited support.   Strengths: goal oriented, motivated  Patient expressed understanding of goal: Yes  Action steps to achieve this goal:  I will continue to lean on informal supports of my: family  I will review resources and supports sent to me via [employer]  I will outreach to supports and consider establishing with ones I might find beneficial.   I will contact my care team with questions, concerns, support needs.   I will use the clinic as a resource and I understand I can contact my clinic with 24/7 after hours services available.   I will continue  to outreach to care coordination as needed for additional resources or supports.                              Care Plan: Community Resources       Problem: Lack of supportive services       Goal: Obtain a County        Start Date: 10/16/2023 Expected End Date: 7/26/2024    This Visit's Progress: 20% Recent Progress: 20%    Priority: High    Note:     Barriers: difficulty navigating services, limited support.   Strengths: goal oriented, motivated  Patient expressed understanding of goal: Yes  Action steps to achieve this goal:  I will continue to lean on informal supports of my: family  I will review resources and supports sent to me via [email or web address]  I will outreach to supports and consider establishing with ones I might find beneficial.  I will continue to outreach to care coordination as needed for additional resources or supports.                              Advance Care Plans/Directives:   Advanced Care Plan/Directives on file:   No    Discussed with patient/caregiver(s): No data recorded           My Medical and Care Information  Problem List   Patient Active Problem List   Diagnosis    CARDIOVASCULAR SCREENING; LDL GOAL LESS THAN 160    Acne vulgaris      Current Medications and Allergies:   No Known Allergies  No current outpatient medications on file.     No current facility-administered medications for this visit.         Care Coordination Start Date: 10/6/2023   Frequency of Care Coordination: monthly, more frequently as needed     Form Last Updated: 05/14/2024

## 2024-05-14 NOTE — PROGRESS NOTES
Clinic Care Coordination Contact  Follow Up Progress Note      Assessment: [facility] outreached to pt on this date for scheduled outreach as well as follow up from  left for writer on 5/14/2024.     Pt shares she was approved for S and received her approval letter. Pt states she spoke with HSS Rep, Mikayla who is helping to facilitate a housing situation for pt. Pt is looking for 2 bedroom. Pt asked again how long this process takes as she has not received a lease yet. [facility] reminded pt Mikayla would have the most up to date information as to this process and where her application and housing stabilization stands. Pt expressed understanding and states he will call Mikayla again. [facility] offered assurance pt just spoke with Mikayla last week and that process seems to be making good time if pt was already approved. [facility] affirmed pt she has been doing well and following up with what she should. Reminded pt this process can take some time and if pt does not hear back from Mikayla by next week on calls she has placed to her- to let [facility] know. Pt agreed.     No new needs.     Care Gaps:    Health Maintenance Due   Topic Date Due    HIV SCREENING  Never done    HEPATITIS C SCREENING  Never done    HEPATITIS B IMMUNIZATION (1 of 3 - 19+ 3-dose series) Never done    COVID-19 Vaccine (4 - 2023-24 season) 09/01/2023       Care Plans  Care Plan: Housing Instability       Problem: SDOH LACK OF STABLE HOUSING       Goal: Establish Stable Housing       Start Date: 10/16/2023 Expected End Date: 7/26/2024    This Visit's Progress: 60% Recent Progress: 50%    Priority: High    Note:     Barriers: difficulty navigating services, limited support.   Strengths: goal oriented, motivated  Patient expressed understanding of goal: Yes  Action steps to achieve this goal:  I will continue to lean on informal supports of my: family  I will review resources and supports sent to me via [employer]  I will outreach to supports and consider establishing with ones I might  find beneficial.   I will contact my care team with questions, concerns, support needs.   I will use the clinic as a resource and I understand I can contact my clinic with 24/7 after hours services available.   I will continue to outreach to care coordination as needed for additional resources or supports.                              Care Plan: Community Resources       Problem: Lack of supportive services       Goal: Obtain a County        Start Date: 10/16/2023 Expected End Date: 7/26/2024    This Visit's Progress: 20% Recent Progress: 20%    Priority: High    Note:     Barriers: difficulty navigating services, limited support.   Strengths: goal oriented, motivated  Patient expressed understanding of goal: Yes  Action steps to achieve this goal:  I will continue to lean on informal supports of my: family  I will review resources and supports sent to me via [employer]  I will outreach to supports and consider establishing with ones I might find beneficial.  I will continue to outreach to care coordination as needed for additional resources or supports.                              Intervention/Education provided during outreach: Redirected pt to community supports for follow up.      Outreach Frequency: monthly, more frequently as needed    Plan: Pt to follow up with HSS on status of application and lease. Pt states she last spoke with Mikayla last week. Care Coordinator will follow up in 3-4 weeks.     Horacio Morrow, [facility]  Clinic Care Coordinator  [facility]  [phone]  [email or web address]

## 2024-05-24 ENCOUNTER — PATIENT OUTREACH (OUTPATIENT)
Dept: CARE COORDINATION | Facility: CLINIC | Age: 44
End: 2024-05-24
Payer: COMMERCIAL

## 2024-05-24 NOTE — PROGRESS NOTES
Clinic Care Coordination Contact  Follow Up Progress Note      Assessment: Pt calling asking about the status of her housing stabilization case. [facility] offered a reminder [facility] does not get updates on this and pt would need to connect with HSS worker, Mikayla. Pt states she has only spoken to her once initially and she has tried to call her since and has not gotten a response. [facility] offered to reach out to Mikayla directly and get back to pt with findings. Pt agreed.     No other needs.     Care Gaps:    Health Maintenance Due   Topic Date Due    HIV SCREENING  Never done    HEPATITIS C SCREENING  Never done    HEPATITIS B IMMUNIZATION (1 of 3 - 19+ 3-dose series) Never done    COVID-19 Vaccine (4 - 2023-24 season) 09/01/2023       Care Plans  Care Plan: Housing Instability       Problem: SDOH LACK OF STABLE HOUSING       Goal: Establish Stable Housing       Start Date: 10/16/2023 Expected End Date: 7/26/2024    This Visit's Progress: 60% Recent Progress: 50%    Priority: High    Note:     Barriers: difficulty navigating services, limited support.   Strengths: goal oriented, motivated  Patient expressed understanding of goal: Yes  Action steps to achieve this goal:  I will continue to lean on informal supports of my: family  I will review resources and supports sent to me via [employer]  I will outreach to supports and consider establishing with ones I might find beneficial.   I will contact my care team with questions, concerns, support needs.   I will use the clinic as a resource and I understand I can contact my clinic with 24/7 after hours services available.   I will continue to outreach to care coordination as needed for additional resources or supports.                              Care Plan: Community Resources       Problem: Lack of supportive services       Goal: Obtain a County        Start Date: 10/16/2023 Expected End Date: 7/26/2024    This Visit's Progress: 20% Recent Progress: 20%    Priority: High     Note:     Barriers: difficulty navigating services, limited support.   Strengths: goal oriented, motivated  Patient expressed understanding of goal: Yes  Action steps to achieve this goal:  I will continue to lean on informal supports of my: family  I will review resources and supports sent to me via [employer]  I will outreach to supports and consider establishing with ones I might find beneficial.  I will continue to outreach to care coordination as needed for additional resources or supports.                              Intervention/Education provided during outreach: Secure email to JIMY Degroot to inquire of status of HSS case.      Plan: Pt to continue to work closely with HSS. Care Coordinator will await response from Mikayla on HSS status. Care Coordination will follow up in 4 or sooner with response from HSS.     Horacio Morrow, [facility]  Clinic Care Coordinator  [facility]  [phone]  [email or web address]

## 2024-06-20 ENCOUNTER — PATIENT OUTREACH (OUTPATIENT)
Dept: CARE COORDINATION | Facility: CLINIC | Age: 44
End: 2024-06-20
Payer: COMMERCIAL

## 2024-06-20 NOTE — PROGRESS NOTES
"Clinic Care Coordination Contact  Follow Up Progress Note      Assessment: [facility] received VM from pt asking for a call back. [facility] also had scheduled outreach due on this date. [facility] outreached to pt to assess for needs. Pt shares her MIL is concerned pt is getting taken advantage of and wanted some reassurance that the HSS program is legitimate and not fraudulant. Pt gave [facility] verbal authorization to call MIL on this date ([employer]- [phone]) to offer some reassurance HSS is legitimate source of support with no concerns for pt being vulnerable to \"signing up for something fraudulent.\"     No updates on housing. Pt continues to be in contact with HSS Program.    [facility] outreached to Jody on this date per pt request. A VM was left with contact information. Left message on Jody's voicemail with call back information and requested return call.      Outreach Documentation Number of Outreach Attempt   6/20/2024   4:01 PM 1       Care Gaps:    Health Maintenance Due   Topic Date Due    HIV SCREENING  Never done    HEPATITIS C SCREENING  Never done    HEPATITIS B IMMUNIZATION (1 of 3 - 19+ 3-dose series) Never done    COVID-19 Vaccine (4 - 2023-24 season) 09/01/2023     Care Plans  Care Plan: Housing Instability       Problem: SDOH LACK OF STABLE HOUSING       Goal: Establish Stable Housing       Start Date: 10/16/2023 Expected End Date: 7/26/2024    This Visit's Progress: 60% Recent Progress: 60%    Priority: High    Note:     Barriers: difficulty navigating services, limited support.   Strengths: goal oriented, motivated  Patient expressed understanding of goal: Yes  Action steps to achieve this goal:  I will continue to lean on informal supports of my: family  I will review resources and supports sent to me via [employer]  I will outreach to supports and consider establishing with ones I might find beneficial.   I will contact my care team with questions, concerns, support needs.   I will use the clinic as a resource " and I understand I can contact my clinic with 24/7 after hours services available.   I will continue to outreach to care coordination as needed for additional resources or supports.                              Care Plan: Community Resources       Problem: Lack of supportive services       Goal: Obtain a County        Start Date: 10/16/2023 Expected End Date: 7/26/2024    This Visit's Progress: 20% Recent Progress: 20%    Priority: High    Note:     Barriers: difficulty navigating services, limited support.   Strengths: goal oriented, motivated  Patient expressed understanding of goal: Yes  Action steps to achieve this goal:  I will continue to lean on informal supports of my: family  I will review resources and supports sent to me via [employer]  I will outreach to supports and consider establishing with ones I might find beneficial.  I will continue to outreach to care coordination as needed for additional resources or supports.                              Intervention/Education provided during outreach: outreached to [facility] to offer some explanation of what HSS is and answer questions about this program to assure family is understanding pt is not signing up for fraudulent programs.    Plan: Care Coordinator will await call back from [facility] if needed. [facility] will follow up in 3-4 weeks.    Horacio Morrow, [facility]  Clinic Care Coordinator  [facility]  [phone]  [email or web address]

## 2024-07-08 ENCOUNTER — HOSPITAL ENCOUNTER (OUTPATIENT)
Dept: MAMMOGRAPHY | Facility: CLINIC | Age: 44
Discharge: HOME | End: 2024-07-08
Payer: COMMERCIAL

## 2024-07-08 DIAGNOSIS — Z12.31 VISIT FOR SCREENING MAMMOGRAM: ICD-10-CM

## 2024-07-08 PROCEDURE — 77063 BREAST TOMOSYNTHESIS BI: CPT

## 2024-07-09 ENCOUNTER — TELEPHONE (OUTPATIENT)
Dept: INTERNAL MEDICINE | Facility: CLINIC | Age: 44
End: 2024-07-09

## 2024-07-09 NOTE — TELEPHONE ENCOUNTER
Patient dropped off [employer] forms at our . I called and spoke to the patient to advise her we will need to have an appointment either virtual or in person. Patient said she will call back to schedule. Form in Inés's green folder.

## 2024-07-12 ENCOUNTER — PATIENT OUTREACH (OUTPATIENT)
Dept: CARE COORDINATION | Facility: CLINIC | Age: 44
End: 2024-07-12
Payer: COMMERCIAL

## 2024-07-12 ASSESSMENT — ACTIVITIES OF DAILY LIVING (ADL): DEPENDENT_IADLS:: TRANSPORTATION

## 2024-07-16 NOTE — PROGRESS NOTES
Clinic Care Coordination Contact  Follow Up Progress Note - Late Entry     Assessment: CC had received a VM from pt asking for a call back. [facility] outreached to pt on this date to follow up on VM message.     Pt shares she has a form for transportation ([employer]) and wants to know more about it. CC explained [employer] service and rides. Pt wanted to make sure it was not a transportation company she would have to navigate from bus to bus and transfer. [facility] explained curb to curb service and explained she may have to ride with other riders, but there are no transfers. Pt expressed understanding. CC explained pt will need to fill out a portion of the form and her provider will need to fill our a portion and this can be done in an appointment. Pt expressed understanding.     No updates on HSS at this time.    No new needs.     Care Gaps:    Health Maintenance Due   Topic Date Due    HIV SCREENING  Never done    HEPATITIS C SCREENING  Never done    HEPATITIS B IMMUNIZATION (1 of 3 - 19+ 3-dose series) Never done    COVID-19 Vaccine (4 - 2023-24 season) 09/01/2023       Care Plans  Care Plan: Housing Instability       Problem: SDOH LACK OF STABLE HOUSING       Goal: Establish Stable Housing       Start Date: 10/16/2023 Expected End Date: 7/26/2024    This Visit's Progress: 60% Recent Progress: 60%    Priority: High    Note:     Barriers: difficulty navigating services, limited support.   Strengths: goal oriented, motivated  Patient expressed understanding of goal: Yes  Action steps to achieve this goal:  I will continue to lean on informal supports of my: family  I will review resources and supports sent to me via [employer]  I will outreach to supports and consider establishing with ones I might find beneficial.   I will contact my care team with questions, concerns, support needs.   I will use the clinic as a resource and I understand I can contact my clinic with 24/7 after hours services available.   I will  continue to outreach to care coordination as needed for additional resources or supports.                              Care Plan: Community Resources       Problem: Lack of supportive services       Goal: Obtain a County        Start Date: 10/16/2023 Expected End Date: 7/26/2024    This Visit's Progress: 20% Recent Progress: 20%    Priority: High    Note:     Barriers: difficulty navigating services, limited support.   Strengths: goal oriented, motivated  Patient expressed understanding of goal: Yes  Action steps to achieve this goal:  I will continue to lean on informal supports of my: family  I will review resources and supports sent to me via [employer]  I will outreach to supports and consider establishing with ones I might find beneficial.  I will continue to outreach to care coordination as needed for additional resources or supports.                              Intervention/Education provided during outreach: [employer] Mob[employer] explanation.     Plan: Pt to follow up with metro mobility application if desired. Pt instructed to schedule an appointment with provider for provider portion. Care Coordinator will follow up in 4 weeks.     Horacio Morrow [facility]  Clinic Care Coordinator  [facility]  [phone]  [email or web address]

## 2024-07-16 NOTE — TELEPHONE ENCOUNTER
Patient scheduled in person appointment 9/16/2024.  This writer called patient to offer VV but patient is okay with waiting to that appointment date.

## 2024-07-23 ENCOUNTER — PATIENT OUTREACH (OUTPATIENT)
Dept: CARE COORDINATION | Facility: CLINIC | Age: 44
End: 2024-07-23
Payer: COMMERCIAL

## 2024-07-23 NOTE — Clinical Note
Isa Leonard,   I got a call from this patient and she was very persistent on wanting me to give her an update on her housing and to meet with her in-person at her next PCP apt on 08/16/24. I see that you have been following. Do you plan to continue to follow or want me to?   Thanks,  Letty

## 2024-07-23 NOTE — PROGRESS NOTES
Clinic Care Coordination Contact  Care Team Conversations    This writer received a call from pt. Pt information verified before proceeding with call. Pt shared that she is looking to speak with a care coordinator. Informed pt of this writer name and role. Pt stated that yesterday she received a letter stating that she is approved for housing but nothing available until 2025. Unclear what pt was wanting or needing from this writer. Initially the pt shared that she wanted to let CC know, but later expressing wanting to know update from this writer. This writer had to explain multiple times that pt will have to call Housing Stabilization Services (HSS) directly. Pt shared that she did but couldn't get in contact with anyone. Pt asked if CC would be in on the 16th (assuming Aug) to meet in person. Asked what pt was needing in-person from CC. Pt shared that she needed help with [employer] application. Per chart review, pt's PCP is aware and has spoken to pt about this. CC informed pt that her PCP would help with this. Pt denied having additional in-person needs from this wrtier and again asked about update on housing. CC informed pt that per other CC notes, pt has a rep with HSS. CC encouraged pt to contact the rep and provided pt with the information. Again reiterated to pt that CC would not be meeting with pt in the clinic. Pt expressed appreciation and denied additional needs at this time.     Per pt chart,  REGINALDO Morrow has been following. This writer send notice and update to Horacio about incoming call and contact with pt.     Letty Perera, [facility]  Primary Care Social Work Care Coordinator  [facility] [place]   PH: [phone]

## 2024-08-26 ENCOUNTER — PATIENT OUTREACH (OUTPATIENT)
Dept: CARE COORDINATION | Facility: CLINIC | Age: 44
End: 2024-08-26
Payer: COMMERCIAL

## 2024-08-26 ASSESSMENT — ACTIVITIES OF DAILY LIVING (ADL): DEPENDENT_IADLS:: TRANSPORTATION

## 2024-08-26 NOTE — LETTER
[facility] CARE COORDINATION  [address]  [facility] [address]    August 29, 2024        Althea Grant  [address] [place] [address]          Dear Raya Oh is an updated Patient Centered Plan of Care for your continued enrollment in Care Coordination. Please let us know if you have additional questions, concerns, or goals that we can assist with.    Sincerely,    Horacio Morrow [facility]  Clinic Care Coordinator  [facility]  [phone]  [email or web address]  Patient Centered Plan of Care  About Me:        Patient Name:  Althea Grant    YOB: 1980  Age:         43 year old   [place] MRN:    [id number] Telephone Information:  Home Phone [phone]   Mobile [phone]   Mobile Not on file.       Address:  [address]  [place] [address] Email address:  [email or web address]      Emergency Contact(s)    Name Relationship Lgl Grd Work Phone Home Phone Mobile Phone   1. MARYZEHRAYRN HAMMOND* Daughter No   [phone]   2. JEFF PEREIRA Son-in-Law No   [phone]           Primary language:  English     needed? No   [place] [facility]:  [phone] op. 1  Other communication barriers:None    Preferred Method of Communication:  Mail  Current living arrangement: I live in a private home with family    Mobility Status/ Medical Equipment: Independent        Health Maintenance  Health Maintenance Reviewed: Due/Overdue   Health Maintenance Due   Topic Date Due    HIV SCREENING  Never done    HEPATITIS C SCREENING  Never done    HEPATITIS B IMMUNIZATION (1 of 3 - 19+ 3-dose series) Never done    COVID-19 Vaccine (4 - 2023-24 season) 09/01/2023           My Access Plan  Medical Emergency 911   Primary Clinic Line [facility] - [phone]   24 Hour Appointment Line [phone]  or  [phone]) (toll-free)   24 Hour Nurse Line [phone] (toll-free)   Preferred Urgent Care No data recorded   Preferred Hospital [facility]  [phone]     Preferred Pharmacy [facility] [address] [place], [place] - [address] [facility]     Behavioral Health Crisis Line The National Suicide Prevention Lifeline at 1-653.808.5308 or Text/Call 248           My Care Team Members  Patient Care Team         Relationship Specialty Notifications Start End    Mar Coles, ISAEL CNP PCP - General Internal Medicine  4/1/24     Phone: [phone] Fax: [phone]         [address] [facility] [address]    Mar Coles APRN CNP Assigned PCP   5/27/23     Phone: [phone] Fax: [phone]         [address] [facility] [address]    Horacio Morrow LSW Clinic Care Coordinator Primary Care - CC Admissions 4/5/24     Phone: [phone]         Horacio Morrow LSW Lead Care Coordinator Primary Care - CC Admissions 4/5/24     Phone: [phone]                     My Care Plans  Self Management and Treatment Plan    Care Plan  Care Plan: Housing Instability       Problem: SDOH LACK OF STABLE HOUSING       Goal: Establish Stable Housing       Start Date: 10/16/2023 Expected End Date: 1/1/2025    This Visit's Progress: 70% Recent Progress: 60%    Priority: High    Note:     Barriers: difficulty navigating services, limited support.   Strengths: goal oriented, motivated  Patient expressed understanding of goal: Yes  Action steps to achieve this goal:  I will continue to lean on informal supports of my: family  I will review resources and supports sent to me via [employer]  I will outreach to supports and consider establishing with ones I might find beneficial.   I will contact my care team with questions, concerns, support needs.   I will use the clinic as a resource and I understand I can contact my clinic with 24/7 after hours services available.   I will continue  to outreach to care coordination as needed for additional resources or supports.                              Care Plan: Community Resources       Problem: Lack of supportive services       Goal: Obtain a County        Start Date: 10/16/2023 Expected End Date: 11/1/2024    This Visit's Progress: 30% Recent Progress: 20%    Priority: High    Note:     Barriers: difficulty navigating services, limited support.   Strengths: goal oriented, motivated  Patient expressed understanding of goal: Yes  Action steps to achieve this goal:  I will continue to lean on informal supports of my: family  I will review resources and supports sent to me via [employer]  I will outreach to supports and consider establishing with ones I might find beneficial.  I will continue to outreach to care coordination as needed for additional resources or supports.                                     My Medical and Care Information  Problem List   Patient Active Problem List   Diagnosis    CARDIOVASCULAR SCREENING; LDL GOAL LESS THAN 160    Acne vulgaris      Current Medications and Allergies:   No Known Allergies  No current outpatient medications on file.     No current facility-administered medications for this visit.         Care Coordination Start Date: 10/6/2023   Frequency of Care Coordination: monthly, more frequently as needed     Form Last Updated: 08/29/2024

## 2024-08-26 NOTE — PROGRESS NOTES
Clinic Care Coordination Contact  [facility]/Voicemail    Clinical Data: Care Coordinator Outreach    Outreach Documentation Number of Outreach Attempt   8/26/2024   2:39 PM 1       Left message on patient's voicemail with call back information and requested return call.    Plan:  Care Coordinator will try to reach patient again in 10 business days.    Horacio Morrow \[facility]\""  Clinic Care Coordinator  [facility]  [phone]  [email or web address]

## 2024-08-29 NOTE — PROGRESS NOTES
Clinic Care Coordination Contact  Follow Up Progress Note      Assessment: [facility] called pt back. Pt states she has been assigned a  through hospitals and has already had one meeting with him. Pt meets with him again on Monday, 9/2/2024.     Pt feels good that things are progressing. They are looking to relocate to a trailer neighborhood and said they were approved for most every park in the state.     No new needs.     Care Gaps:    Health Maintenance Due   Topic Date Due    HIV SCREENING  Never done    HEPATITIS C SCREENING  Never done    HEPATITIS B IMMUNIZATION (1 of 3 - 19+ 3-dose series) Never done    COVID-19 Vaccine (4 - 2023-24 season) 09/01/2023         Care Plans  Care Plan: Housing Instability       Problem: SDOH LACK OF STABLE HOUSING       Goal: Establish Stable Housing       Start Date: 10/16/2023 Expected End Date: 1/1/2025    This Visit's Progress: 70% Recent Progress: 60%    Priority: High    Note:     Barriers: difficulty navigating services, limited support.   Strengths: goal oriented, motivated  Patient expressed understanding of goal: Yes  Action steps to achieve this goal:  I will continue to lean on informal supports of my: family  I will review resources and supports sent to me via [employer]  I will outreach to supports and consider establishing with ones I might find beneficial.   I will contact my care team with questions, concerns, support needs.   I will use the clinic as a resource and I understand I can contact my clinic with 24/7 after hours services available.   I will continue to outreach to care coordination as needed for additional resources or supports.                              Care Plan: Community Resources       Problem: Lack of supportive services       Goal: Obtain a County        Start Date: 10/16/2023 Expected End Date: 11/1/2024    This Visit's Progress: 30% Recent Progress: 20%    Priority: High    Note:     Barriers: difficulty navigating services,  limited support.   Strengths: goal oriented, motivated  Patient expressed understanding of goal: Yes  Action steps to achieve this goal:  I will continue to lean on informal supports of my: family  I will review resources and supports sent to me via [employer]  I will outreach to supports and consider establishing with ones I might find beneficial.  I will continue to outreach to care coordination as needed for additional resources or supports.                              Intervention/Education provided during outreach: Praised pt for her diligence and advocacy for herself and her family to get to this point.      Plan: Pt to continue to lean on HSS . Care Coordinator will follow up in 4 weeks.     Horacio Morrow [facility]  Clinic Care Coordinator  [facility]  [phone]  [email or web address]

## 2024-09-16 ENCOUNTER — TELEPHONE (OUTPATIENT)
Dept: INTERNAL MEDICINE | Facility: CLINIC | Age: 44
End: 2024-09-16

## 2024-09-16 NOTE — TELEPHONE ENCOUNTER
Forms/Letter Request    Type of form/letter:  transportation (metro mobility)    Have you been seen for this request: Yes     Do we have the form/letter: Yes: Pt dropped it off at the clinic /Pt stated 4/1/2024 which is what the visit was for     When is form/letter needed by: ASAP     How would you like the form/letter returned: Fax there is a number on the paper     Patient Notified form requests are processed in 3-5 business days:Yes    Okay to leave a detailed message?: Yes at Cell number on file:    Telephone Information:   Mobile [phone]   Mobile Not on file.

## 2024-09-16 NOTE — TELEPHONE ENCOUNTER
Called and spoke with patient. A tele appt was made for 9- so her metro mobilit form can be completed. . Patient does not know how to do a video visit.

## 2024-09-16 NOTE — TELEPHONE ENCOUNTER
Per the forms protocol because the patient has been seen within the past year I was asked to give Mar this form to complete. Form in your mailbox to be signed.

## 2024-09-16 NOTE — TELEPHONE ENCOUNTER
Spoke with patient . There is a form that we have had since 7-2024. This writer will huddlke with [facility] team for further review.     Patient asking for return call and next steps     Patient can be reached at [phone]

## 2024-09-19 ENCOUNTER — VIRTUAL VISIT (OUTPATIENT)
Dept: INTERNAL MEDICINE | Facility: CLINIC | Age: 44
End: 2024-09-19
Payer: COMMERCIAL

## 2024-09-19 DIAGNOSIS — F81.9 LEARNING DISABILITY: Primary | ICD-10-CM

## 2024-09-19 PROCEDURE — 99443 PR PHYSICIAN TELEPHONE EVALUATION 21-30 MIN: CPT | Mod: 93

## 2024-09-19 NOTE — PROGRESS NOTES
Althea is a 43 year old who is being evaluated via a billable telephone visit.    What phone number would you like to be contacted at? [phone]  How would you like to obtain your AVS? Mail a copy  Originating Location (pt. Location): Home    Distant Location (provider location):  On-site          (F81.9) Learning disability  (primary encounter diagnosis)  Comment: Metro mobility forms filled out today per patient request. Pt may need assistance in filling out her portion of the forms. She is in need of metro mobility due to her learning disability as she has difficulty using public transportation.    I have reached out to her  to help patient fill out her section       The longitudinal plan of care for the diagnosis(es)/condition(s) as documented were addressed during this visit. Due to the added complexity in care, I will continue to support Althea in the subsequent management and with ongoing continuity of care.        Subjective   Althea is a 43 year old, presenting for the following health issues:  Forms (Need Metro Mobility Form filled out.)      9/19/2024    11:40 AM   Additional Questions   Roomed by Letty Pedersen MA   Accompanied by Self         9/19/2024   Forms   Any forms needing to be completed Yes        HPI           Objective           Vitals:  No vitals were obtained today due to virtual visit.    Physical Exam   General: Alert and no distress //Respiratory: No audible wheeze, cough, or shortness of breath // Psychiatric:  Appropriate affect, tone, and pace of words            Phone call duration: 30 minutes  Signed Electronically by: ISAEL Ramirez CNP

## 2024-09-20 NOTE — TELEPHONE ENCOUNTER
Upon completion, pls email the completed metro mobility form to [email or web address]    Nilesh is a housing stabilization  working with this patient. His call back number is [phone]    Patient was on the call with caller and writer an did provider verbal okay to speak with Nilesh.

## 2024-09-20 NOTE — TELEPHONE ENCOUNTER
Forms were emailed the address below and the  Nilesh was called and informed. The originals are with the copies if the pt wants them mailed to her.

## 2024-09-20 NOTE — TELEPHONE ENCOUNTER
My portion is filled out but pt needs assistance filling out her portion of the forms. Forms are in my mailbox

## 2024-09-27 ENCOUNTER — PATIENT OUTREACH (OUTPATIENT)
Dept: CARE COORDINATION | Facility: CLINIC | Age: 44
End: 2024-09-27
Payer: COMMERCIAL

## 2024-09-27 ASSESSMENT — ACTIVITIES OF DAILY LIVING (ADL): DEPENDENT_IADLS:: TRANSPORTATION

## 2024-09-27 NOTE — PROGRESS NOTES
Clinic Care Coordination Contact  Follow Up Progress Note      Assessment: Outreached to pt to follow up from request from Care Team to assist with MM forms. Care Team indicates they feel pt will need help with her portion of MM Application. PCP has completed provider portion.     Pt shares today her current  with HSS, [place]- [phone], is coming Tuesday, 10/1/2024 to assist with the forms. Pt denies any needs in this area and feels housing is progressing.     No new needs.     Care Gaps:    Health Maintenance Due   Topic Date Due    HIV SCREENING  Never done    HEPATITIS C SCREENING  Never done    HEPATITIS B IMMUNIZATION (1 of 3 - 19+ 3-dose series) Never done    INFLUENZA VACCINE (1) 09/01/2024    COVID-19 Vaccine (4 - 2024-25 season) 09/01/2024         Care Plans  Care Plan: Housing Instability       Problem: SDOH LACK OF STABLE HOUSING       Goal: Establish Stable Housing       Start Date: 10/16/2023 Expected End Date: 1/1/2025    This Visit's Progress: 80% Recent Progress: 70%    Priority: High    Note:     Barriers: difficulty navigating services, limited support.   Strengths: goal oriented, motivated  Patient expressed understanding of goal: Yes  Action steps to achieve this goal:  I will continue to lean on informal supports of my: family  I will review resources and supports sent to me via [employer]  I will outreach to supports and consider establishing with ones I might find beneficial.   I will contact my care team with questions, concerns, support needs.   I will use the clinic as a resource and I understand I can contact my clinic with 24/7 after hours services available.   I will continue to outreach to care coordination as needed for additional resources or supports.                              Care Plan: Community Resources       Problem: Lack of supportive services       Goal: Obtain a County        Start Date: 10/16/2023 Expected End Date: 11/1/2024    This  Visit's Progress: 40% Recent Progress: 30%    Priority: High    Note:     Barriers: difficulty navigating services, limited support.   Strengths: goal oriented, motivated  Patient expressed understanding of goal: Yes  Action steps to achieve this goal:  I will continue to lean on informal supports of my: family  I will review resources and supports sent to me via [employer]  I will outreach to supports and consider establishing with ones I might find beneficial.  I will continue to outreach to care coordination as needed for additional resources or supports.                              Intervention/Education provided during outreach: Reviewed needs for form completion assistance.      Plan: Pt to continue to work closely with HSS. Care Coordinator will follow up in 3-4 weeks.     Horacio Morrow [facility]  Clinic Care Coordinator  [facility]  [phone]  [email or web address]

## 2024-10-31 ENCOUNTER — PATIENT OUTREACH (OUTPATIENT)
Dept: CARE COORDINATION | Facility: CLINIC | Age: 44
End: 2024-10-31
Payer: COMMERCIAL

## 2024-10-31 ASSESSMENT — ACTIVITIES OF DAILY LIVING (ADL): DEPENDENT_IADLS:: TRANSPORTATION

## 2024-10-31 NOTE — PROGRESS NOTES
Clinic Care Coordination Contact  Follow Up Progress Note      Assessment: [facility] received a call back from pt at number listed. Pt apologized and said her phone will say it is out of service when it is turned off. Pt asked [facility] call her at the same number again to test it and it did in fact connect.     Pt denied any new needs. Pt continues with  and they are working to improve pt credit score to assist with her apartment hunt and eligibility. Next housing meeting will be 12/18/24. Metro Mobility pending as was completed by .     No new needs.    Care Gaps:    Health Maintenance Due   Topic Date Due    HIV SCREENING  Never done    HEPATITIS C SCREENING  Never done    HEPATITIS B IMMUNIZATION (1 of 3 - 19+ 3-dose series) Never done    INFLUENZA VACCINE (1) 09/01/2024    COVID-19 Vaccine (4 - 2024-25 season) 09/01/2024       Care Plans  Care Plan: Housing Instability       Problem: SDOH LACK OF STABLE HOUSING       Goal: Establish Stable Housing       Start Date: 10/16/2023 Expected End Date: 1/1/2025    This Visit's Progress: 80% Recent Progress: 80%    Priority: High    Note:     Barriers: difficulty navigating services, limited support.   Strengths: goal oriented, motivated  Patient expressed understanding of goal: Yes  Action steps to achieve this goal:  I will continue to lean on informal supports of my: family  I will review resources and supports sent to me via [employer]  I will outreach to supports and consider establishing with ones I might find beneficial.   I will contact my care team with questions, concerns, support needs.   I will use the clinic as a resource and I understand I can contact my clinic with 24/7 after hours services available.   I will continue to outreach to care coordination as needed for additional resources or supports.                              Care Plan: Community Resources       Problem: Lack of supportive services       Goal: Obtain a [facility] Case  Worker       Start Date: 10/16/2023 Expected End Date: 11/1/2024    This Visit's Progress: 50% Recent Progress: 40%    Priority: High    Note:     Barriers: difficulty navigating services, limited support.   Strengths: goal oriented, motivated  Patient expressed understanding of goal: Yes  Action steps to achieve this goal:  I will continue to lean on informal supports of my: family  I will review resources and supports sent to me via [employer]  I will outreach to supports and consider establishing with ones I might find beneficial.  I will continue to outreach to care coordination as needed for additional resources or supports.                              Intervention/Education provided during outreach: Affirmation of pt hard work and consistency to advocate for self and housing situation.     Plan: Pt to continue to follow up with . Care Coordinator will follow up in 4 weeks.     SHIRA Rodriguez  Clinic Care Coordinator  [facility]  [phone]  [email or web address]

## 2024-10-31 NOTE — PROGRESS NOTES
"Clinic Care Coordination Contact  [facility]    Clinical Data: Care Coordinator Outreach    Outreach Documentation Number of Outreach Attempt   10/31/2024  10:01 AM 2       Number listed for pt is \"no longer in service\". No other number listed in chart or in chart review. No Care Everywhere documentation with alternate contacts. Called MIL to inquire if pt had alternate number. Jody indicates she will call pt and ask her to call CC back.     Plan: If no call back, Care Coordinator will try to reach patient again in 10 business days.    Horacio Morrow [facility]  Clinic Care Coordinator  [facility]  [phone]  [email or web address]        "

## 2024-11-26 ENCOUNTER — TELEPHONE (OUTPATIENT)
Dept: INTERNAL MEDICINE | Facility: CLINIC | Age: 44
End: 2024-11-26
Payer: COMMERCIAL

## 2024-11-26 NOTE — TELEPHONE ENCOUNTER
[facility] Support Plan recieved via fax.    Jaz Mari is [facility] coordinator, can be contacted by    Tele: [phone]   Fax: [phone]  [email or web address]    Notification sent to scanning

## 2024-12-03 ENCOUNTER — PATIENT OUTREACH (OUTPATIENT)
Dept: CARE COORDINATION | Facility: CLINIC | Age: 44
End: 2024-12-03
Payer: COMMERCIAL

## 2024-12-03 ASSESSMENT — ACTIVITIES OF DAILY LIVING (ADL): DEPENDENT_IADLS:: TRANSPORTATION

## 2024-12-03 NOTE — PROGRESS NOTES
Clinic Care Coordination Contact  [facility]/Voicemail    Clinical Data: Care Coordinator Outreach    Outreach Documentation Number of Outreach Attempt   10/31/2024  10:01 AM 2   12/3/2024   2:04 PM 1       Left message on patient's voicemail with call back information and requested return call.      Plan: Care Coordinator will try to reach patient again in 10 business days.    Horacio Morrow [facility]  Clinic Care Coordinator  [facility]  [phone]  [email or web address]

## 2024-12-16 ENCOUNTER — PATIENT OUTREACH (OUTPATIENT)
Dept: CARE COORDINATION | Facility: CLINIC | Age: 44
End: 2024-12-16
Payer: COMMERCIAL

## 2024-12-16 ASSESSMENT — ACTIVITIES OF DAILY LIVING (ADL): DEPENDENT_IADLS:: TRANSPORTATION

## 2024-12-16 NOTE — PROGRESS NOTES
Clinic Care Coordination Contact  Clinic Care Coordination Contact  OUTREACH    Referral Information:  Referral Source: PCP    Primary Diagnosis: Psychosocial    Chief Complaint   Patient presents with    Clinic Care Coordination - Follow-up     Annual Assessment        Universal Utilization:   Clinic Utilization  Difficulty keeping appointments:: No  Compliance Concerns: No  No-Show Concerns: No  No PCP office visit in Past Year: No  Utilization      No Show Count (past year)  0             ED Visits  0             Hospital Admissions  0                    Current as of: 12/15/2024  9:38 PM                Clinical Concerns:  Current Medical Concerns:    Patient Active Problem List   Diagnosis    CARDIOVASCULAR SCREENING; LDL GOAL LESS THAN 160    Acne vulgaris       [facility] outreached to pt on this date for annual assessment.     Pt shares she continues to work with [employer] worker ([phone]) and anticipates a move come summer. Pt also has [employer] mobility now to utilize for transportation as needed. Pt as been assigned a [facility] Care Coordinator:     Jaz Mari   [facility] coordinator  Tele: [phone]   Fax: [phone]  [email or web address]    Pt continues to live with dtr until she and spouse can be placed in a place of their own. No issues with living setting expiring or unstable.     No other new needs.     Current Behavioral Concerns: None noted or identified.     Education Provided to patient: Care Coordination role and support.      Pain  Pain (GOAL):: No  Health Maintenance Reviewed: Due/Overdue   Health Maintenance Due   Topic Date Due    HIV SCREENING  Never done    HEPATITIS C SCREENING  Never done    HEPATITIS B IMMUNIZATION (1 of 3 - 19+ 3-dose series) Never done    INFLUENZA VACCINE (1) 09/01/2024    COVID-19 Vaccine (4 - 2024-25 season) 09/01/2024       Clinical Pathway: None    Medication Management:  Medication review status: Medications reviewed and no changes reported per patient.           Functional Status:  Dependent ADLs:: Independent  Dependent IADLs:: Transportation  Bed or wheelchair confined:: No  Mobility Status: Independent    Living Situation:  Current living arrangement:: I live in a private home with family    Lifestyle & Psychosocial Needs:    Social Drivers of Health     Food Insecurity: Not on file   Depression: Not at risk (4/1/2024)    PHQ-2     PHQ-2 Score: 0   Housing Stability: Low Risk  (10/16/2023)    Housing Stability     Do you have housing? : Yes     Are you worried about losing your housing?: No   Tobacco Use: Unknown (9/19/2024)    Patient History     Smoking Tobacco Use: Never     Smokeless Tobacco Use: Unknown     Passive Exposure: Not on file   Financial Resource Strain: Low Risk  (10/16/2023)    Financial Resource Strain     Within the past 12 months, have you or your family members you live with been unable to get utilities (heat, electricity) when it was really needed?: No   Alcohol Use: Not on file   Transportation Needs: Low Risk  (10/16/2023)    Transportation Needs     Within the past 12 months, has lack of transportation kept you from medical appointments, getting your medicines, non-medical meetings or appointments, work, or from getting things that you need?: No   Physical Activity: Not on file   Interpersonal Safety: Not on file   Stress: Not on file   Social Connections: Not on file   Health Literacy: Not on file     Diet:: Regular  Inadequate nutrition (GOAL):: No  Tube Feeding: No  Inadequate activity/exercise (GOAL):: No  Significant changes in sleep pattern (GOAL): No  Transportation means:: Family, Medical transport, Regular car     Zoroastrian or spiritual beliefs that impact treatment:: No  Mental health DX:: No  Mental health management concern (GOAL):: No  Chemical Dependency Status: No Current Concerns  Informal Support system:: Children, Family     Care Coordinator has reviewed patient's Social Determinants of Health (SDoH) on this date. Upon  review, changes were not  made.        Resources and Interventions:  Current Resources: [facility] Care Coordinator, HSS Worker     Community Resources: County Programs, Transportation Services  Supplies Currently Used at Home: None  Equipment Currently Used at Home: none  Employment Status: disabled    Advance Care Plan/Directive  Advanced Care Plans/Directives on file:: No    Referrals Placed: Community Resources, [facility] Resources      Care Plan:  Care Plan: Housing Instability       Problem: SDOH LACK OF STABLE HOUSING       Goal: Establish Stable Housing       Start Date: 10/16/2023 Expected End Date: 3/3/2025    This Visit's Progress: 80% Recent Progress: 80%    Priority: High    Note:     Barriers: difficulty navigating services, limited support.   Strengths: goal oriented, motivated  Patient expressed understanding of goal: Yes  Action steps to achieve this goal:  I will continue to lean on informal supports of my: family  I will review resources and supports sent to me via [employer]  I will outreach to supports and consider establishing with ones I might find beneficial.   I will contact my care team with questions, concerns, support needs.   I will use the clinic as a resource and I understand I can contact my clinic with 24/7 after hours services available.   I will continue to outreach to care coordination as needed for additional resources or supports.                              Care Plan: Community Resources       Problem: Lack of supportive services       Goal: Obtain a [facility]        Start Date: 10/16/2023 Expected End Date: 3/3/2025    This Visit's Progress: 60% Recent Progress: 50%    Priority: High    Note:     Barriers: difficulty navigating services, limited support.   Strengths: goal oriented, motivated  Patient expressed understanding of goal: Yes  Action steps to achieve this goal:  I will continue to lean on informal supports of my: family  I will review resources and supports sent  to me via [employer]  I will outreach to supports and consider establishing with ones I might find beneficial.  I will continue to outreach to care coordination as needed for additional resources or supports.                              Patient/Caregiver understanding: Pt reports understanding and denies any additional questions or concerns at this times. MITCHEL CC engaged in [employer] communication during encounter.      Outreach Frequency: monthly, more frequently as needed      Plan: Pt to continue to follow up with current community and payor supports. Care Coordination to follow up monthly.     Horacio Morrow, [facility]  Clinic Care Coordinator  [facility]  [phone]  [email or web address]

## 2024-12-16 NOTE — LETTER
[facility] CARE COORDINATION  [address] [place] [address]    December 16, 2024        Althea Maldonado  [address] [place] [address]          Dear Raya Oh is an updated Patient Centered Plan of Care for your continued enrollment in Care Coordination. Please let us know if you have additional questions, concerns, or goals that we can assist with.    Sincerely,    Horacio Morrow [facility]  Clinic Care Coordinator  [facility]  [phone]  [email or web address]  Patient Centered Plan of Care  About Me:        Patient Name:  Althea Maldonado    YOB: 1980  Age:         43 year old   [place] MRN:    [id number] Telephone Information:  Home Phone [phone]   Mobile [phone]   Mobile Not on file.       Address:  [address]  [place] [address] Email address:  [email or web address]      Emergency Contact(s)    Name Relationship Lgl Grd Work Phone Home Phone Mobile Phone   1. YRN MALDONADO* Daughter No   [phone]   2. RANDALLJEFF Son-in-Law No   [phone]   3. SONJA MALDONADO Spouse    [phone]           Primary language:  English     needed? No   [place] [facility]:  [phone] op. 1  Other communication barriers:None    Preferred Method of Communication:  Mail  Current living arrangement: I live in a private home with family    Mobility Status/ Medical Equipment: Independent        Health Maintenance  Health Maintenance Reviewed: Due/Overdue   Health Maintenance Due   Topic Date Due    HIV SCREENING  Never done    HEPATITIS C SCREENING  Never done    HEPATITIS B IMMUNIZATION (1 of 3 - 19+ 3-dose series) Never done    INFLUENZA VACCINE (1) 09/01/2024    COVID-19 Vaccine (4 - 2024-25 season) 09/01/2024           My Access Plan  Medical Emergency 911   Primary Clinic Line [facility]  [facility] - [phone]   24 Hour Appointment Line [phone] or  [phone]) (toll-free)   24 Hour Nurse Line [phone] (toll-free)   Preferred Urgent Care No data recorded   Preferred Hospital [facility]  [phone]     Preferred Pharmacy [facility] [address] [place], [place] - [address] [facility]     Behavioral Health Crisis Line The National Suicide Prevention Lifeline at 1-189.730.6726 or Text/Call 818           My Care Team Members  Patient Care Team         Relationship Specialty Notifications Start End    Mar Coles, ISAEL CNP PCP - General Internal Medicine  4/1/24     Phone: [phone] Fax: [phone]         [address] [facility] [address]    Mar Coles APRN CNP Assigned PCP   5/27/23     Phone: [phone] Fax: [phone]         [address] [facility] [address]    Horacio Morrow LSW Clinic Care Coordinator Primary Care - CC Admissions 4/5/24     Phone: [phone]         Horacio Morrow LSW Lead Care Coordinator Primary Care - CC Admissions 4/5/24     Phone: [phone]                     My Care Plans  Self Management and Treatment Plan    Care Plan  Care Plan: Housing Instability       Problem: SDOH LACK OF STABLE HOUSING       Goal: Establish Stable Housing       Start Date: 10/16/2023 Expected End Date: 3/3/2025    This Visit's Progress: 80% Recent Progress: 80%    Priority: High    Note:     Barriers: difficulty navigating services, limited support.   Strengths: goal oriented, motivated  Patient expressed understanding of goal: Yes  Action steps to achieve this goal:  I will continue to lean on informal supports of my: family  I will review resources and supports sent to me via [employer]  I will outreach to supports and consider establishing with ones I might find beneficial.   I will contact my care team with questions, concerns, support needs.   I will use the clinic as a resource and I understand I  can contact my clinic with 24/7 after hours services available.   I will continue to outreach to care coordination as needed for additional resources or supports.                              Care Plan: Community Resources       Problem: Lack of supportive services       Goal: Obtain a County        Start Date: 10/16/2023 Expected End Date: 3/3/2025    This Visit's Progress: 60% Recent Progress: 50%    Priority: High    Note:     Barriers: difficulty navigating services, limited support.   Strengths: goal oriented, motivated  Patient expressed understanding of goal: Yes  Action steps to achieve this goal:  I will continue to lean on informal supports of my: family  I will review resources and supports sent to me via [employer]  I will outreach to supports and consider establishing with ones I might find beneficial.  I will continue to outreach to care coordination as needed for additional resources or supports.                              Action Plans on File:                       Advance Care Plans/Directives:   Advanced Care Plan/Directives on file: No    Discussed with patient/caregiver(s): No data recorded             My Medical and Care Information  Problem List   Patient Active Problem List   Diagnosis    CARDIOVASCULAR SCREENING; LDL GOAL LESS THAN 160    Acne vulgaris      Current Medications:  Please refer to the most recent medication list provided to you by your medical team and reach out to your provider with any questions or to make any corrections.    Care Coordination Start Date: 10/6/2023   Frequency of Care Coordination: monthly, more frequently as needed     Form Last Updated: 12/16/2024

## 2025-02-07 ENCOUNTER — PATIENT OUTREACH (OUTPATIENT)
Dept: CARE COORDINATION | Facility: CLINIC | Age: 45
End: 2025-02-07
Payer: COMMERCIAL

## 2025-02-07 ASSESSMENT — ACTIVITIES OF DAILY LIVING (ADL): DEPENDENT_IADLS:: TRANSPORTATION

## 2025-02-07 NOTE — PROGRESS NOTES
Clinic Care Coordination Contact  Follow Up Progress Note      Assessment: [facility] received call from pt.     Pt indicates she has questions regarding her housing/rental voucher. CC reviewed current supports. Pt has a a , and a [employer] Care Coordinator. Pt had questions about her [employer] preventative incentives. [facility] guided pt to Jaz Mari, [employer] Care Coordinator. Verified pt has this contact number.     Pt also shared she had some questions about her housing voucher. [facility] guided pt to [place], . Pt acknowledged and said she would call with these questions. Pt also said she will be meeting her on Monday, 2/10/2025.     Pt shares she and spouse will be moving in March to new apartment.     Care Gaps:    Health Maintenance Due   Topic Date Due    HIV SCREENING  Never done    HEPATITIS C SCREENING  Never done    HEPATITIS B IMMUNIZATION (1 of 3 - 19+ 3-dose series) Never done    INFLUENZA VACCINE (1) 09/01/2024    COVID-19 Vaccine (4 - 2024-25 season) 09/01/2024    PHQ-2 (once per calendar year)  01/01/2025       Care Plans  Care Plan: Housing Instability       Problem: SDOH LACK OF STABLE HOUSING       Goal: Establish Stable Housing       Start Date: 10/16/2023 Expected End Date: 3/3/2025    This Visit's Progress: 80% Recent Progress: 80%    Priority: High    Note:     Barriers: difficulty navigating services, limited support.   Strengths: goal oriented, motivated  Patient expressed understanding of goal: Yes  Action steps to achieve this goal:  I will continue to lean on informal supports of my: family  I will review resources and supports sent to me via [employer]  I will outreach to supports and consider establishing with ones I might find beneficial.   I will contact my care team with questions, concerns, support needs.   I will use the clinic as a resource and I understand I can contact my clinic with 24/7 after hours services available.   I will continue to outreach to care  coordination as needed for additional resources or supports.                              Care Plan: Community Resources       Problem: Lack of supportive services       Goal: Obtain a County        Start Date: 10/16/2023 Expected End Date: 3/3/2025    This Visit's Progress: 60% Recent Progress: 50%    Priority: High    Note:     Barriers: difficulty navigating services, limited support.   Strengths: goal oriented, motivated  Patient expressed understanding of goal: Yes  Action steps to achieve this goal:  I will continue to lean on informal supports of my: family  I will review resources and supports sent to me via [email or web address]  I will outreach to supports and consider establishing with ones I might find beneficial.  I will continue to outreach to care coordination as needed for additional resources or supports.                              Intervention/Education provided during outreach: Directed pt to different supports pt currently has and which person would be most appropriate for which questions.     Plan: Pt to connect with [employer] for Ucare questions and housing specialists for housing voucher questions. Care Coordinator will follow up in 4 weeks.     SHIRA Rodriguez  Clinic Care Coordinator  [facility]  [phone]  [email or web address]

## 2025-03-03 ENCOUNTER — PATIENT OUTREACH (OUTPATIENT)
Dept: CARE COORDINATION | Facility: CLINIC | Age: 45
End: 2025-03-03
Payer: COMMERCIAL

## 2025-03-12 ENCOUNTER — PATIENT OUTREACH (OUTPATIENT)
Dept: CARE COORDINATION | Facility: CLINIC | Age: 45
End: 2025-03-12
Payer: COMMERCIAL

## 2025-03-12 ASSESSMENT — ACTIVITIES OF DAILY LIVING (ADL): DEPENDENT_IADLS:: TRANSPORTATION

## 2025-03-12 NOTE — PROGRESS NOTES
Clinic Care Coordination Contact  Follow Up Progress Note      Assessment: [facility] received call back from pt.     Pt says she has not listened to [facility] VM that was left. Pt says new  is needing all the information about her from the previous  and asked if [facility] can help with that.     [facility] offered reminders that [employer] is working with the previous  to transfer her case. [facility] reminded pt writer has received VM messages from new  and [facility] has left messages for him but we haven't been able to connect but have at least been able to keep one another updated. [facility] reminded pt if there was anything the clinic is needing to do or provide to the new  he will notify [facility] as agreed upon and [facility] can help facilitate this. Pt expressed understanding. [facility] offered reminder pt needs to let [employer] and new and very involved  assist with some of the logistics. Pt expressed understanding.     [facility] offered reminder of Primary Care Care Coordination role and support vs [employer] role and support. Reminded pt of new 's contact.     Care Gaps:    Health Maintenance Due   Topic Date Due    HIV SCREENING  Never done    HEPATITIS C SCREENING  Never done    HEPATITIS B IMMUNIZATION (1 of 3 - 19+ 3-dose series) Never done    INFLUENZA VACCINE (1) 09/01/2024    COVID-19 Vaccine (4 - 2024-25 season) 09/01/2024    PHQ-2 (once per calendar year)  01/01/2025    YEARLY PREVENTIVE VISIT  04/01/2025       Care Plans  Care Plan: Housing Instability       Problem: SDOH LACK OF STABLE HOUSING       Goal: Establish Stable Housing       Start Date: 10/16/2023 Expected End Date: 5/1/2025    This Visit's Progress: 80% Recent Progress: 80%    Priority: High    Note:     Barriers: difficulty navigating services, limited support.   Strengths: goal oriented, motivated  Patient expressed understanding  of goal: Yes  Action steps to achieve this goal:  I will continue to lean on informal supports of my: family  I will review resources and supports sent to me via [employer]  I will outreach to supports and consider establishing with ones I might find beneficial.   I will contact my care team with questions, concerns, support needs.   I will use the clinic as a resource and I understand I can contact my clinic with 24/7 after hours services available.   I will continue to outreach to care coordination as needed for additional resources or supports.                              Care Plan: Community Resources       Problem: Lack of supportive services       Goal: Obtain a County        Start Date: 10/16/2023 Expected End Date: 5/1/2025    This Visit's Progress: 60% Recent Progress: 60%    Priority: High    Note:     Barriers: difficulty navigating services, limited support.   Strengths: goal oriented, motivated  Patient expressed understanding of goal: Yes  Action steps to achieve this goal:  I will continue to lean on informal supports of my: family  I will review resources and supports sent to me via [employer]  I will outreach to supports and consider establishing with ones I might find beneficial.  I will continue to outreach to care coordination as needed for additional resources or supports.                              Intervention/Education provided during outreach: redirection and reminders of roles for community and clinic supports.     Plan: Pt Care Coordinator will follow up in 1-2 weeks.     SHIRA Rodriguez  Clinic Care Coordinator  [facility]  [phone]  [email or web address]

## 2025-03-12 NOTE — PROGRESS NOTES
Clinic Care Coordination Contact  [facility]/Voicemail    Clinical Data: Care Coordinator Outreach    Outreach Documentation Number of Outreach Attempt   3/12/2025   1:50 PM 1       Left message on patient's voicemail with call back information and requested return call.      Plan: Care Coordinator will try to reach patient again in 3-5 business days.    Horacio Morrow [facility]  Clinic Care Coordinator  [facility]  [phone]  [email or web address]

## 2025-03-17 ENCOUNTER — PATIENT OUTREACH (OUTPATIENT)
Dept: CARE COORDINATION | Facility: CLINIC | Age: 45
End: 2025-03-17
Payer: COMMERCIAL

## 2025-03-27 ENCOUNTER — PATIENT OUTREACH (OUTPATIENT)
Dept: CARE COORDINATION | Facility: CLINIC | Age: 45
End: 2025-03-27
Payer: COMMERCIAL

## 2025-03-27 ASSESSMENT — ACTIVITIES OF DAILY LIVING (ADL): DEPENDENT_IADLS:: TRANSPORTATION

## 2025-03-27 NOTE — LETTER
[facility] CARE COORDINATION  [address] [place] [address]    March 27, 2025        Althea Maldonado  [address] [place] [address]          Dear Raya Oh is an updated Patient Centered Plan of Care for your continued enrollment in Care Coordination. Please let us know if you have additional questions, concerns, or goals that we can assist with.    Sincerely,    Horacio Morrow [facility]  Clinic Care Coordinator  [facility]  [phone]  [email or web address]  Patient Centered Plan of Care  About Me:        Patient Name:  Althea Maldonado    YOB: 1980  Age:         44 year old   [place] MRN:    [id number] Telephone Information:  Home Phone [phone]   Mobile [phone]   Mobile Not on file.       Address:  [address]  [place] [address] Email address:  [email or web address]      Emergency Contact(s)    Name Relationship Lgl Grd Work Phone Home Phone Mobile Phone   1. YRN MALDONADO* Daughter No   [phone]   2. RANDALLJEFF Son-in-Law No   [phone]   3. SONJA MALDONADO Spouse    [phone]           Primary language:  English     needed? No   [place] [facility]:  [phone] op. 1  Other communication barriers:None    Preferred Method of Communication:  Mail  Current living arrangement: I live in a private home with family    Mobility Status/ Medical Equipment: Independent        Health Maintenance  Health Maintenance Reviewed: Due/Overdue   Health Maintenance Due   Topic Date Due    HIV SCREENING  Never done    HEPATITIS C SCREENING  Never done    HEPATITIS B IMMUNIZATION (1 of 3 - 19+ 3-dose series) Never done    INFLUENZA VACCINE (1) 09/01/2024    COVID-19 Vaccine (4 - 2024-25 season) 09/01/2024    PHQ-2 (once per calendar year)  01/01/2025    YEARLY PREVENTIVE VISIT  04/01/2025            My Access Plan  Medical Emergency 911   Primary Clinic Line [facility] - [phone]   24 Hour Appointment Line [phone] or  [phone]) (toll-free)   24 Hour Nurse Line [phone] (toll-free)   Preferred Urgent Care No data recorded   Preferred Hospital [facility]  [phone]     Preferred Pharmacy [facility] [address] [facility] [address] [facility]     Behavioral Health Crisis Line [facility] [place] Suicide Prevention Lifeline at 1-873.300.3537 or Text/Call 8           My Care Team Members  Patient Care Team         Relationship Specialty Notifications Start End    Mar Coles APRN CNP PCP - General Internal Medicine  4/1/24     Phone: [phone] Fax: [phone]         [address] [facility] [address]    Mar Coles APRN CNP Assigned PCP   5/27/23     Phone: [phone] Fax: [phone]         [address] [facility] [address]    Horacio Morrow LSW Clinic Care Coordinator Primary Care - CC Admissions 4/5/24     Phone: [phone]         Horacio Morrow LSW Lead Care Coordinator Primary Care - CC Admissions 4/5/24     Phone: [phone]                     My Care Plans  Self Management and Treatment Plan    Care Plan  Care Plan: Housing Instability       Problem: SDOH LACK OF STABLE HOUSING       Goal: Establish Stable Housing       Start Date: 10/16/2023 Expected End Date: 5/1/2025    This Visit's Progress: 50% Recent Progress: 80%    Priority: High    Note:     Barriers: difficulty navigating services, limited support.   Strengths: goal oriented, motivated  Patient expressed understanding of goal: Yes  Action steps to achieve this goal:  I will continue to lean on informal supports of my: family  I will review resources and supports sent to me via [employer]  I will outreach to supports and consider establishing with ones I might find beneficial.   I will contact my care team with questions,  concerns, support needs.   I will use the clinic as a resource and I understand I can contact my clinic with 24/7 after hours services available.   I will continue to outreach to care coordination as needed for additional resources or supports.                              Care Plan: Community Resources       Problem: Lack of supportive services       Goal: Obtain a County        Start Date: 10/16/2023 Expected End Date: 5/1/2025    This Visit's Progress: On Hold Recent Progress: 60%    Priority: High    Note:     Barriers: difficulty navigating services, limited support.   Strengths: goal oriented, motivated  Patient expressed understanding of goal: Yes  Action steps to achieve this goal:  I will continue to lean on informal supports of my: family  I will review resources and supports sent to me via [employer]  I will outreach to supports and consider establishing with ones I might find beneficial.  I will continue to outreach to care coordination as needed for additional resources or supports.                                Advance Care Plans/Directives:   Advanced Care Plan/Directives on file: No    Discussed with patient/caregiver(s): No data recorded           My Medical and Care Information  Problem List   Patient Active Problem List   Diagnosis    CARDIOVASCULAR SCREENING; LDL GOAL LESS THAN 160    Acne vulgaris      Current Medications:  Please refer to the most recent medication list provided to you by your medical team and reach out to your provider with any questions or to make any corrections.    Care Coordination Start Date: 10/6/2023   Frequency of Care Coordination: monthly, more frequently as needed     Form Last Updated: 03/27/2025

## 2025-03-27 NOTE — PROGRESS NOTES
Clinic Care Coordination Contact  Follow Up Progress Note      Assessment: [facility] outreached to pt on this date. Pt shares she is currently staying with her daughter and their previous rental home sold. Pt says she is stable right now, but continues to try to be flexible with some of the unknowns. Pt says she has not heard much from the newly assigned , Deepthi. [facility] has also made a few attempts without success. [facility] offered to outreach to [employer] to try to understand more.     [facility] outreached to Deepthi- [phone]. The VM box was full. [facility] outreached to [employer] supervisor, Onofre and left a message inquiring of any status updates and transfer of case as pt states Deepthi has shared he doesn't have any paperwork on pt and cannot work on her case until he has the paperwork.     [facility] will await response from Onofre or Deepthi.     Care Gaps:    Health Maintenance Due   Topic Date Due    HIV SCREENING  Never done    HEPATITIS C SCREENING  Never done    HEPATITIS B IMMUNIZATION (1 of 3 - 19+ 3-dose series) Never done    INFLUENZA VACCINE (1) 09/01/2024    COVID-19 Vaccine (4 - 2024-25 season) 09/01/2024    PHQ-2 (once per calendar year)  01/01/2025    YEARLY PREVENTIVE VISIT  04/01/2025       Care Plans  Care Plan: Housing Instability       Problem: SDOH LACK OF STABLE HOUSING       Goal: Establish Stable Housing       Start Date: 10/16/2023 Expected End Date: 5/1/2025    This Visit's Progress: 50% Recent Progress: 80%    Priority: High    Note:     Barriers: difficulty navigating services, limited support.   Strengths: goal oriented, motivated  Patient expressed understanding of goal: Yes  Action steps to achieve this goal:  I will continue to lean on informal supports of my: family  I will review resources and supports sent to me via [employer]  I will outreach to supports and consider establishing with ones I might find beneficial.   I will contact my care team with questions,  concerns, support needs.   I will use the clinic as a resource and I understand I can contact my clinic with 24/7 after hours services available.   I will continue to outreach to care coordination as needed for additional resources or supports.                              Care Plan: Community Resources       Problem: Lack of supportive services       Goal: Obtain a County        Start Date: 10/16/2023 Expected End Date: 5/1/2025    This Visit's Progress: On Hold Recent Progress: 60%    Priority: High    Note:     Barriers: difficulty navigating services, limited support.   Strengths: goal oriented, motivated  Patient expressed understanding of goal: Yes  Action steps to achieve this goal:  I will continue to lean on informal supports of my: family  I will review resources and supports sent to me via [employer]  I will outreach to supports and consider establishing with ones I might find beneficial.  I will continue to outreach to care coordination as needed for additional resources or supports.                              Intervention/Education provided during outreach: Outreached to [employer] On pt behalf.      Outreach Frequency: monthly, more frequently as needed    Plan: Pt to continue to work with [facility]SS. Care Coordinator will follow up in 3-4 weeks.     SHIRA Rodriguez  Clinic Care Coordinator  [facility]  [phone]  [email or web address]

## 2025-04-04 ENCOUNTER — ANCILLARY ORDERS (OUTPATIENT)
Dept: INTERNAL MEDICINE | Facility: CLINIC | Age: 45
End: 2025-04-04
Payer: COMMERCIAL

## 2025-04-04 DIAGNOSIS — Z12.31 VISIT FOR SCREENING MAMMOGRAM: Primary | ICD-10-CM

## 2025-05-12 ENCOUNTER — TELEPHONE (OUTPATIENT)
Dept: INTERNAL MEDICINE | Facility: CLINIC | Age: 45
End: 2025-05-12

## 2025-05-12 NOTE — TELEPHONE ENCOUNTER
FYI - Status Update    Who is Calling: patient    Update:  was wondering if Dr Coles filled out the form that was sent from me     Does caller want a call/response back: Yes     Okay to leave a detailed message?: Yes at Cell number on file:    Telephone Information:   Mobile [phone]   Mobile Not on file.

## 2025-05-13 NOTE — TELEPHONE ENCOUNTER
Left message for patient to call back  When and how did she send the form? I do not believe we have received anything.

## 2025-05-15 ENCOUNTER — PATIENT OUTREACH (OUTPATIENT)
Dept: CARE COORDINATION | Facility: CLINIC | Age: 45
End: 2025-05-15

## 2025-05-15 NOTE — PROGRESS NOTES
"Clinic Care Coordination Contact  Follow Up Progress Note      Assessment: Duplicate ask from last month. Pt has not yet reviewed this with [employer]:     [facility] received VM from pt indicating she is needing to know if we \"received the forms for [employer]\". [facility] reviewed record and do not see any messages or notations referencing any forms for [employer] at this time. [facility] outreached to pt to clarify and offer [employer] Care Coordinator contact if by chance pt was needing to speak with [employer] directly:      Jaz Mari is [employer] coordinator, can be contacted by  Tele: [phone]   Fax: [phone]  [email or web address]    Per record, staff have not received forms pt mailed.     Care Gaps:    Health Maintenance Due   Topic Date Due    HIV SCREENING  Never done    HEPATITIS C SCREENING  Never done    HEPATITIS B IMMUNIZATION (1 of 3 - 19+ 3-dose series) Never done    COVID-19 Vaccine (4 - 2024-25 season) 09/01/2024    PHQ-2 (once per calendar year)  01/01/2025    YEARLY PREVENTIVE VISIT  04/01/2025       Care Plans  Care Plan: Housing Instability       Problem: SDOH LACK OF STABLE HOUSING       Goal: Establish Stable Housing       Start Date: 10/16/2023 Expected End Date: 7/1/2025    This Visit's Progress: 50% Recent Progress: 50%    Priority: High    Note:     Barriers: difficulty navigating services, limited support.   Strengths: goal oriented, motivated  Patient expressed understanding of goal: Yes  Action steps to achieve this goal:  I will continue to lean on informal supports of my: family  I will review resources and supports sent to me via [employer]  I will outreach to supports and consider establishing with ones I might find beneficial.   I will contact my care team with questions, concerns, support needs.   I will use the clinic as a resource and I understand I can contact my clinic with 24/7 after hours services available.   I will continue to outreach to care coordination as needed for additional resources or supports.  "                             Care Plan: Community Resources       Problem: Lack of supportive services       Goal: Obtain a County        Start Date: 10/16/2023 Expected End Date: 8/4/2025    Recent Progress: On Hold    Priority: High    Note:     Barriers: difficulty navigating services, limited support.   Strengths: goal oriented, motivated  Patient expressed understanding of goal: Yes  Action steps to achieve this goal:  I will continue to lean on informal supports of my: family  I will review resources and supports sent to me via [employer]  I will outreach to supports and consider establishing with ones I might find beneficial.  I will continue to outreach to care coordination as needed for additional resources or supports.                              Intervention/Education provided during outreach: Strongly encouraged pt outreach to [facility] Care Coordinator. Provided [facility] Care Coordinator contact.      Plan: Pt to follow up with [facility] Care Coordinator. Care Coordinator will follow up in 3-4 weeks.     Horacio Morrow [facility]  Clinic Care Coordinator  [facility]  [phone]  [email or web address]

## 2025-05-19 NOTE — TELEPHONE ENCOUNTER
[place] Department of Human Services form received via mail     Forms in  mail box for review and signature.     Patient has been notified that forms are in the clinic

## 2025-05-20 ENCOUNTER — PATIENT OUTREACH (OUTPATIENT)
Dept: CARE COORDINATION | Facility: CLINIC | Age: 45
End: 2025-05-20

## 2025-05-20 NOTE — PROGRESS NOTES
Clinic Care Coordination Contact  Care Team Conversations    Asked by care team member to assist with DHS Professional Statement of Need for Housing Stabilization Services, [place] for pt and pt . Called and spoke with pt and pt  to clarify and get some questions answered. The rest of the form was completed by this writer. Pt and pt  denied additional needs, questions, or concerns. PCP and Primary PCP CC Horacio Morrow updated.     Pt and pt wife are not enrolled in PCP CC support. No additional needs identified at this time.     Forms were sent by:   Jonnathan Price   [address]  [place] [address]  E: [email or web address]   PH: [phone]    RONI Herr  [facility]   Primary Care Social Work Care Coordinator  [facility]   Phone: [phone]

## 2025-06-02 ENCOUNTER — PATIENT OUTREACH (OUTPATIENT)
Dept: CARE COORDINATION | Facility: CLINIC | Age: 45
End: 2025-06-02
Payer: COMMERCIAL

## 2025-06-02 DIAGNOSIS — Z59.71 INSURANCE COVERAGE PROBLEMS: ICD-10-CM

## 2025-06-02 DIAGNOSIS — Z71.89 COUNSELING AND COORDINATION OF CARE: Primary | ICD-10-CM

## 2025-06-02 NOTE — PROGRESS NOTES
"Clinic Care Coordination Contact  Follow Up Progress Note      Assessment: [facility] received 1 VM from pt to call back regarding insurance question.     ([facility] also received 7 Voicemails from pt dtr, Lilliam,  from 9:00am-9:27am with very inappropriate and rude comments expressing frustration that insurance is not active. Dtr's VMs referred to [facility] as \"Hermila\" and seemed to believe writer worked at [facility].)    [facility] called pt back to review. Pt states she turned in her [facility] renewal last month. Pt also moved to [place] with her dtr. Pt and [facility] reviewed insurance could be delayed because of move, or just that the counties are behind. [facility] reviewed the possibility that pt missed mailers which might have asked for more documentation. Pt apologized for Vms from dtr and told writer to \"excuse her\" adding \"she's really confused about some of this.\"    FRW referral placed to assist with application or renewal.     [facility] outreached to  through [employer] [facility]. Updated [facility] that [facility] was not a part of renewal process but FRW referral placed and pt planned to take some time to go to the [facility] to talk to someone in person to find out more about what is missing. Pt was unsure when she'd get a ride for this.     No new needs.     Care Gaps:    Health Maintenance Due   Topic Date Due    HIV SCREENING  Never done    HEPATITIS C SCREENING  Never done    HEPATITIS B VACCINE (1 of 3 - 19+ 3-dose series) Never done    COVID-19 VACCINE (4 - 2024-25 season) 09/01/2024    PHQ-2 (once per calendar year)  01/01/2025    YEARLY PREVENTIVE VISIT  04/01/2025       Care Plans  Care Plan: Housing Instability       Problem: SDOH LACK OF STABLE HOUSING       Goal: Establish Stable Housing       Start Date: 10/16/2023 Expected End Date: 7/1/2025    This Visit's Progress: 50% Recent Progress: 50%    Priority: High    Note:     Barriers: difficulty navigating services, limited support.   Strengths: goal oriented, " motivated  Patient expressed understanding of goal: Yes  Action steps to achieve this goal:  I will continue to lean on informal supports of my: family  I will review resources and supports sent to me via [employer]  I will outreach to supports and consider establishing with ones I might find beneficial.   I will contact my care team with questions, concerns, support needs.   I will use the clinic as a resource and I understand I can contact my clinic with 24/7 after hours services available.   I will continue to outreach to care coordination as needed for additional resources or supports.                              Care Plan: Community Resources       Problem: Lack of supportive services       Goal: Obtain a [facility]        Start Date: 10/16/2023 Expected End Date: 8/4/2025    Recent Progress: On Hold    Priority: High    Note:     Barriers: difficulty navigating services, limited support.   Strengths: goal oriented, motivated  Patient expressed understanding of goal: Yes  Action steps to achieve this goal:  I will continue to lean on informal supports of my: family  I will review resources and supports sent to me via [employer]  I will outreach to supports and consider establishing with ones I might find beneficial.  I will continue to outreach to care coordination as needed for additional resources or supports.                              Intervention/Education provided during outreach: Called  Deepthi. Updated on last communication and pending MA.     FRW referral placed.      Plan: Pt to work with [facility] and [facility] for MA renewal. Care Coordinator will follow up in 2-3 weeks.     SHIRA Rodriguez  Clinic Care Coordinator  [facility]  [phone]  [email or web address]

## 2025-06-03 ENCOUNTER — PATIENT OUTREACH (OUTPATIENT)
Dept: CARE COORDINATION | Facility: CLINIC | Age: 45
End: 2025-06-03
Payer: COMMERCIAL

## 2025-07-02 ENCOUNTER — PATIENT OUTREACH (OUTPATIENT)
Dept: CARE COORDINATION | Facility: CLINIC | Age: 45
End: 2025-07-02
Payer: COMMERCIAL

## 2025-07-02 ASSESSMENT — ACTIVITIES OF DAILY LIVING (ADL): DEPENDENT_IADLS:: TRANSPORTATION

## 2025-07-02 NOTE — PROGRESS NOTES
Clinic Care Coordination Contact  Follow Up Progress Note      Assessment: [facility] outreached to pt to follow up on status and assess for needs.     Pt shares she continues to work with [employer] for housing options. Pt states she did recently apply to another place and was approved but there are no current openings.     Pt insurance is now open and active. Not concerns with this.     Pt indicates she is currently working with [facility] Care Coordinator to see if she qualifies for a waiver. Pt has contacted the [facility] and is awaiting an assessment.     No new needs.     Care Gaps:    Health Maintenance Due   Topic Date Due    HIV SCREENING  Never done    HEPATITIS C SCREENING  Never done    HEPATITIS B VACCINE (1 of 3 - 19+ 3-dose series) Never done    COVID-19 VACCINE (4 - 2024-25 season) 09/01/2024    PHQ-2 (once per calendar year)  01/01/2025    YEARLY PREVENTIVE VISIT  04/01/2025       Care Plans  Care Plan: Housing Instability       Problem: SDOH LACK OF STABLE HOUSING       Goal: Establish Stable Housing       Start Date: 10/16/2023 Expected End Date: 9/1/2025    This Visit's Progress: 50% Recent Progress: 50%    Priority: High    Note:     Barriers: difficulty navigating services, limited support.   Strengths: goal oriented, motivated  Patient expressed understanding of goal: Yes  Action steps to achieve this goal:  I will continue to lean on informal supports of my: family  I will review resources and supports sent to me via [email or web address]  I will outreach to supports and consider establishing with ones I might find beneficial.   I will contact my care team with questions, concerns, support needs.   I will use the clinic as a resource and I understand I can contact my clinic with 24/7 after hours services available.   I will continue to outreach to care coordination as needed for additional resources or supports.                              Care Plan: Community Resources       Problem: Lack of supportive services       Goal:  Obtain a County        Start Date: 10/16/2023 Expected End Date: 8/4/2025    This Visit's Progress: 10% Recent Progress: On Hold    Priority: High    Note:     Barriers: difficulty navigating services, limited support.   Strengths: goal oriented, motivated  Patient expressed understanding of goal: Yes  Action steps to achieve this goal:  I will continue to lean on informal supports of my: family  I will review resources and supports sent to me via [employer]  I will outreach to supports and consider establishing with ones I might find beneficial.  I will continue to outreach to care coordination as needed for additional resources or supports.                              Intervention/Education provided during outreach: [employer] Assessment.      Outreach Frequency: monthly, more frequently as needed    Plan: Care Coordinator will follow up in 4 weeks.     Horacio Morrow [facility]  Clinic Care Coordinator  [facility]  [phone]  [email or web address]

## 2025-07-02 NOTE — LETTER
[facility] CARE COORDINATION  [address]  [facility] [address]    July 2, 2025        Althea Maldonado  [address]  [place] [facility] [place] [address]          Dear Raya Oh is an updated Patient Centered Plan of Care for your continued enrollment in Care Coordination. Please let us know if you have additional questions, concerns, or goals that we can assist with.    Sincerely,    Horacio Morrow [facility]  Clinic Care Coordinator  [facility]  [phone]  [email or web address]  Patient Centered Plan of Care  About Me:        Patient Name:  Althea Maldonado    YOB: 1980  Age:         44 year old   [place] MRN:    [id number] Telephone Information:  Home Phone [phone]   Mobile [phone]   Mobile Not on file.       Address:  [address] [address] Email address:  [email or web address]      Emergency Contact(s)    Name Relationship Lgl Grd Work Phone Home Phone Mobile Phone   1. JOELYRN* Daughter No   [phone]   2. ANNIE PEREIRAIN Son-in-Law No   [phone]   3. SONJA MALDONADO Spouse    [phone]           Primary language:  English     needed? No   [place] [employer]:  [phone] op. 1  Other communication barriers:None    Preferred Method of Communication:  Mail  Current living arrangement: I live in a private home with family    Mobility Status/ Medical Equipment: Independent        Health Maintenance  Health Maintenance Reviewed: Due/Overdue   Health Maintenance Due   Topic Date Due    HIV SCREENING  Never done    HEPATITIS C SCREENING  Never done    HEPATITIS B VACCINE (1 of 3 - 19+ 3-dose series) Never done    COVID-19 VACCINE (4 - 2024-25 season) 09/01/2024    PHQ-2 (once per calendar year)  01/01/2025    YEARLY PREVENTIVE VISIT  04/01/2025           My Access Plan  Medical Emergency 911   Primary Clinic Line [facility]  [phone]   24 Hour Appointment Line [phone] or  [phone]) (toll-free)   24 Hour Nurse Line [phone] (toll-free)   Preferred Urgent Care No data recorded   Preferred Hospital [facility]  [phone]     Preferred Pharmacy [facility] [address] [place] [address] [address] [facility]     Behavioral Health Crisis Line The National Suicide Prevention Lifeline at 1-640.276.8169 or Text/Call 908           My Care Team Members  Patient Care Team         Relationship Specialty Notifications Start End    Mar Coles APRN CNP PCP - General Internal Medicine  4/1/24     Phone: [phone] Fax: [phone]         [address] [facility] [address]    Mar Coles APRN CNP Assigned PCP   5/27/23     Phone: [phone] Fax: [phone]         [address] [facility] [address]    Horacio Morrow LSW Clinic Care Coordinator Primary Care - CC Admissions 4/5/24     Phone: [phone]         Horacio Morrow LSW Lead Care Coordinator Primary Care - [facility] Admissions 4/5/24     Phone: [phone]                     My Care Plans  Self Management and Treatment Plan    Care Plan  Care Plan: Housing Instability       Problem: SDOH LACK OF STABLE HOUSING       Goal: Establish Stable Housing       Start Date: 10/16/2023 Expected End Date: 9/1/2025    This Visit's Progress: 50% Recent Progress: 50%    Priority: High    Note:     Barriers: difficulty navigating services, limited support.   Strengths: goal oriented, motivated  Patient expressed understanding of goal: Yes  Action steps to achieve this goal:  I will continue to lean on informal supports of my: family  I will review resources and supports sent to me via [employer]  I will outreach to supports and consider establishing with ones I might find beneficial.   I will contact my care team with questions, concerns, support needs.   I will use the clinic as a resource and I understand I can contact my clinic with  24/7 after hours services available.   I will continue to outreach to care coordination as needed for additional resources or supports.                              Care Plan: Community Resources       Problem: Lack of supportive services       Goal: Obtain a County        Start Date: 10/16/2023 Expected End Date: 8/4/2025    This Visit's Progress: 10% Recent Progress: On Hold    Priority: High    Note:     Barriers: difficulty navigating services, limited support.   Strengths: goal oriented, motivated  Patient expressed understanding of goal: Yes  Action steps to achieve this goal:  I will continue to lean on informal supports of my: family  I will review resources and supports sent to me via [employer]  I will outreach to supports and consider establishing with ones I might find beneficial.  I will continue to outreach to care coordination as needed for additional resources or supports.                                Advance Care Plans/Directives:   Advanced Care Plan/Directives on file: No    Discussed with patient/caregiver(s): No data recorded           My Medical and Care Information  Problem List   Patient Active Problem List   Diagnosis    CARDIOVASCULAR SCREENING; LDL GOAL LESS THAN 160    Acne vulgaris      Current Medications:  Please refer to the most recent medication list provided to you by your medical team and reach out to your provider with any questions or to make any corrections.    Care Coordination Start Date: 10/6/2023   Frequency of Care Coordination: monthly, more frequently as needed     Form Last Updated: 07/02/2025

## 2025-07-10 ENCOUNTER — ANCILLARY PROCEDURE (OUTPATIENT)
Dept: MAMMOGRAPHY | Facility: CLINIC | Age: 45
End: 2025-07-10
Payer: COMMERCIAL

## 2025-07-10 DIAGNOSIS — Z12.31 VISIT FOR SCREENING MAMMOGRAM: ICD-10-CM

## 2025-07-10 PROCEDURE — 77063 BREAST TOMOSYNTHESIS BI: CPT

## 2025-08-12 ENCOUNTER — PATIENT OUTREACH (OUTPATIENT)
Dept: CARE COORDINATION | Facility: CLINIC | Age: 45
End: 2025-08-12
Payer: COMMERCIAL

## 2025-08-14 ENCOUNTER — OFFICE VISIT (OUTPATIENT)
Dept: INTERNAL MEDICINE | Facility: CLINIC | Age: 45
End: 2025-08-14
Payer: COMMERCIAL

## 2025-08-14 VITALS
BODY MASS INDEX: 31.18 KG/M2 | DIASTOLIC BLOOD PRESSURE: 74 MMHG | HEIGHT: 60 IN | RESPIRATION RATE: 12 BRPM | HEART RATE: 88 BPM | SYSTOLIC BLOOD PRESSURE: 138 MMHG | TEMPERATURE: 98.7 F | OXYGEN SATURATION: 99 % | WEIGHT: 158.8 LBS

## 2025-08-14 DIAGNOSIS — Z13.9 ENCOUNTER FOR SCREENING INVOLVING SOCIAL DETERMINANTS OF HEALTH (SDOH): ICD-10-CM

## 2025-08-14 DIAGNOSIS — Z00.00 ROUTINE GENERAL MEDICAL EXAMINATION AT A HEALTH CARE FACILITY: Primary | ICD-10-CM

## 2025-08-14 PROCEDURE — 1126F AMNT PAIN NOTED NONE PRSNT: CPT | Performed by: NURSE PRACTITIONER

## 2025-08-14 PROCEDURE — 99396 PREV VISIT EST AGE 40-64: CPT | Performed by: NURSE PRACTITIONER

## 2025-08-14 PROCEDURE — 3078F DIAST BP <80 MM HG: CPT | Performed by: NURSE PRACTITIONER

## 2025-08-14 PROCEDURE — 3075F SYST BP GE 130 - 139MM HG: CPT | Performed by: NURSE PRACTITIONER

## 2025-08-14 SDOH — HEALTH STABILITY: PHYSICAL HEALTH: ON AVERAGE, HOW MANY DAYS PER WEEK DO YOU ENGAGE IN MODERATE TO STRENUOUS EXERCISE (LIKE A BRISK WALK)?: 1 DAY

## 2025-08-14 SDOH — HEALTH STABILITY: PHYSICAL HEALTH: ON AVERAGE, HOW MANY MINUTES DO YOU ENGAGE IN EXERCISE AT THIS LEVEL?: 20 MIN

## 2025-08-14 ASSESSMENT — PAIN SCALES - GENERAL: PAINLEVEL_OUTOF10: NO PAIN (0)

## 2025-08-14 ASSESSMENT — SOCIAL DETERMINANTS OF HEALTH (SDOH): HOW OFTEN DO YOU GET TOGETHER WITH FRIENDS OR RELATIVES?: PATIENT DECLINED

## 2025-09-04 ENCOUNTER — TELEPHONE (OUTPATIENT)
Dept: INTERNAL MEDICINE | Facility: CLINIC | Age: 45
End: 2025-09-04
Payer: COMMERCIAL